# Patient Record
Sex: MALE | Race: WHITE | Employment: FULL TIME | URBAN - METROPOLITAN AREA
[De-identification: names, ages, dates, MRNs, and addresses within clinical notes are randomized per-mention and may not be internally consistent; named-entity substitution may affect disease eponyms.]

---

## 2017-01-23 RX ORDER — FLUTICASONE PROPIONATE 50 MCG
1 SPRAY, SUSPENSION (ML) NASAL DAILY
Qty: 1 BOTTLE | Refills: 5 | Status: SHIPPED | OUTPATIENT
Start: 2017-01-23 | End: 2018-02-13 | Stop reason: SDUPTHER

## 2017-07-31 ENCOUNTER — OFFICE VISIT (OUTPATIENT)
Dept: FAMILY MEDICINE CLINIC | Age: 37
End: 2017-07-31

## 2017-07-31 VITALS
RESPIRATION RATE: 12 BRPM | TEMPERATURE: 98.5 F | BODY MASS INDEX: 27.37 KG/M2 | SYSTOLIC BLOOD PRESSURE: 110 MMHG | WEIGHT: 180 LBS | HEART RATE: 63 BPM | DIASTOLIC BLOOD PRESSURE: 80 MMHG

## 2017-07-31 DIAGNOSIS — R19.7 DIARRHEA, UNSPECIFIED TYPE: Primary | ICD-10-CM

## 2017-07-31 PROCEDURE — 99213 OFFICE O/P EST LOW 20 MIN: CPT | Performed by: NURSE PRACTITIONER

## 2017-07-31 ASSESSMENT — ENCOUNTER SYMPTOMS
DIARRHEA: 1
SHORTNESS OF BREATH: 0
BLOOD IN STOOL: 0
VOMITING: 0
NAUSEA: 0
ABDOMINAL PAIN: 1
CONSTIPATION: 0

## 2017-07-31 ASSESSMENT — PATIENT HEALTH QUESTIONNAIRE - PHQ9
SUM OF ALL RESPONSES TO PHQ9 QUESTIONS 1 & 2: 0
SUM OF ALL RESPONSES TO PHQ QUESTIONS 1-9: 0
1. LITTLE INTEREST OR PLEASURE IN DOING THINGS: 0
2. FEELING DOWN, DEPRESSED OR HOPELESS: 0

## 2017-09-06 ENCOUNTER — TELEPHONE (OUTPATIENT)
Dept: FAMILY MEDICINE CLINIC | Age: 37
End: 2017-09-06

## 2017-09-13 ENCOUNTER — OFFICE VISIT (OUTPATIENT)
Dept: FAMILY MEDICINE CLINIC | Age: 37
End: 2017-09-13

## 2017-09-13 VITALS
SYSTOLIC BLOOD PRESSURE: 130 MMHG | HEIGHT: 68 IN | BODY MASS INDEX: 26.95 KG/M2 | TEMPERATURE: 98.4 F | WEIGHT: 177.8 LBS | RESPIRATION RATE: 20 BRPM | DIASTOLIC BLOOD PRESSURE: 78 MMHG | HEART RATE: 68 BPM

## 2017-09-13 DIAGNOSIS — R14.3 FLATULENCE: Primary | ICD-10-CM

## 2017-09-13 DIAGNOSIS — L82.1 SEBORRHEIC KERATOSES: ICD-10-CM

## 2017-09-13 PROCEDURE — 99213 OFFICE O/P EST LOW 20 MIN: CPT | Performed by: FAMILY MEDICINE

## 2017-10-04 DIAGNOSIS — R14.3 FLATULENCE: Primary | ICD-10-CM

## 2017-10-04 DIAGNOSIS — Z80.0 FAMILY HISTORY OF COLON CANCER: ICD-10-CM

## 2017-12-14 ENCOUNTER — OFFICE VISIT (OUTPATIENT)
Dept: DERMATOLOGY | Age: 37
End: 2017-12-14

## 2017-12-14 DIAGNOSIS — D48.9 NEOPLASM OF UNCERTAIN BEHAVIOR: Primary | ICD-10-CM

## 2017-12-14 DIAGNOSIS — Z78.9 NON-TOBACCO USER: ICD-10-CM

## 2017-12-14 DIAGNOSIS — L82.1 SEBORRHEIC KERATOSES: ICD-10-CM

## 2017-12-14 DIAGNOSIS — L57.8 PHOTOAGING OF SKIN: ICD-10-CM

## 2017-12-14 DIAGNOSIS — D22.9 MULTIPLE BENIGN MELANOCYTIC NEVI: ICD-10-CM

## 2017-12-14 PROCEDURE — 99243 OFF/OP CNSLTJ NEW/EST LOW 30: CPT | Performed by: DERMATOLOGY

## 2017-12-14 PROCEDURE — 11100 PR BIOPSY OF SKIN LESION: CPT | Performed by: DERMATOLOGY

## 2017-12-14 RX ORDER — FEXOFENADINE HCL 180 MG/1
180 TABLET ORAL DAILY
COMMUNITY

## 2017-12-14 NOTE — PROGRESS NOTES
[facility]) Dermatology  [facility] Current, [address], [facility]       Irene Grant  1980    42 y.o. male     Date of Visit: 2017    Chief Complaint:   Chief Complaint   Patient presents with    Skin Exam    Lesion(s)     left cheek, had previous removal but has come back and right chest        I was asked to see this patient by Dr. Erin Leigh. History of Present Illness:  Irene Grant is a 42 y.o. male who presents with the chief complaint of Establish care and for the followin. Inflamed of a spot to his left cheek that had been previously shave removed by outside physician years ago. He was never told that it was cancerous. The lesion has reappeared over last several months to years. Denies symptoms of pain, itching, bleeding, tenderness. She would like evaluated. 2.  TBSE. Many year history of multiple nevi on the trunk and extremities, all present for many years. Denies new moles. Denies moles changing in size, shape, color. None associated w/ pain, bleeding, pruritus. 3.  Complains of a spot to his right upper chest that is pink in color and has appeared over a year time frame. Thinks it possibly may be enlarging, he denies associated burning, bleeding, pain, or itch. Admits to history of sun exposure without wearing sunscreen, hats, protective clothing. He does try to make more of an effort to wear these items when outdoors in recent years. He admits to burning easily. Review of Systems:  Constitutional: Reports general sense of well-being   Skin: No new or changing moles, no history of keloids or hypertrophic scars. Heme: No abnormal bruising or bleeding. Past Medical History, Family History, Surgical History, Medications and Allergies reviewed. Past Skin Hx:   Patient denies past history of melanoma, NMSC, dysplastic nevi, or chronic skin rashes.     PFHx: Mother father-history of basal cell carcinoma    Family History   Problem Relation Age of Onset    Cancer Mother      Shelia Cueva Father      thinks [phone]     Past Medical History:   Diagnosis Date    Allergic rhinitis, seasonal     Keratosis pilaris      History reviewed. No pertinent surgical history. Allergies   Allergen Reactions    Amoxicillin Hives     Outpatient Prescriptions Marked as Taking for the 12/14/17 encounter (Office Visit) with Ayesha Michel DO   Medication Sig Dispense Refill    fexofenadine (ALLEGRA ALLERGY) 180 MG tablet Take 180 mg by mouth daily      fluticasone (FLONASE) 50 MCG/ACT nasal spray 1 spray by Nasal route daily 1 Bottle 5    Multiple Vitamins-Minerals (ONE DAILY MULTIVITAMIN MEN) TABS Take by mouth         Social History:   Social History     Social History    Marital status:      Spouse name: Lelo Gary Number of children: 3    Years of education: N/A     Occupational History           Social History Main Topics    Smoking status: Never Smoker    Smokeless tobacco: Never Used      Comment: counseled on tobacco exposure avoidance    Alcohol use No    Drug use: No    Sexual activity: Yes     Partners: Female     Other Topics Concern    Not on file     Social History Narrative    No narrative on file       Physical Examination     The following were examined and determined to be normal: Psych/Neuro, Scalp/hair, Head/face, Conjunctivae/eyelids, Gums/teeth/lips, Neck, Abdomen, Back, RUE, LUE, RLE, LLE and Nails/digits. Groin/buttocks. Genitalia not examined. The following were examined and determined to be abnormal: Breast/axilla/chest.     -General: NAD, well-nourished, well-developed. Total body skin exam performed, areas examined listed above:   1. 1.2x1.0 erythematous papules with irregular border located to right superior medial chest    2.stuck-on appearing tan-brown verrucous papules and plaques to left cheek, abdomen, back  3.  Scattered on the trunk and extremities are multiple well-defined round and oval symmetric smoothly-bordered uniformly brown macules and papules. no change in size/shape/color of any lesions; no bleeding lesions. 4.  Face, neck, chest, upper extremities-Scattered dyspigmentation with multiple ephelides, and lentigines consistent with chronic sun exposure    Assessment and Plan     1. Neoplasm of uncertain behavior    2. Seborrheic keratoses    3. Multiple benign melanocytic nevi    4. Photoaging of skin    5. Non-tobacco user        1. Neoplasm of uncertain behavior  DDX: Rule out [facility]  -Discussed possible diagnosis. Patient agreeable to biopsy. Verbal consent obtained after risks (infection, bleeding, scar), benefits and alternatives explained. -Area(s) to be biopsied were marked with a surgical pen. Site(s) were cleansed with alcohol. Local anesthesia achieved with 1% lidocaine with epinephrine/sodium bicarbonate. Shave biopsy performed with a razor blade. Hemostasis was achieved with aluminum chloride and electrodessication. The wound(s) were dressed with petrolatum and covered with a bandage. Specimen(s) sent to pathology. Pt educated re: risk of bleeding, infection, scar and wound care instructions.    - SC BIOPSY OF SKIN LESION    2. Seborrheic keratoses  Patient educated that seborrheic keratoses have no malignant potential.    -Reassurance provided to the patient regarding their chronic and benign nature. No treatment performed    3.  Multiple benign melanocytic nevi  Benign acquired melanocytic nevi  -Recommend monthly self skin exams   -Educated regarding the ABCDEs of melanoma detection   -Call for any new/changing moles or concerning lesions  -Reviewed sun protective behavior -- sun avoidance during the peak hours of the day, sun-protective clothing (including hat and sunglasses), sunscreen use (water resistant, broad spectrum, SPF at least 30, need for reapplication every 2 to 3 hours), avoidance of tanning beds   -Plan: Observation with annual skin checks (earlier if indicated) performed

## 2017-12-14 NOTE — PATIENT INSTRUCTIONS
Biopsy Wound Care Instructions   Cleanse the wound with mild soapy water daily.  Gently dry the area.  Apply Vaseline or petroleum jelly to the wound using a cotton tipped applicator or Qtip.  Cover with a clean bandage.  Repeat this process until the biopsy site is healed.  If you had stitches placed, continue treating the site until the stitches are removed. Remember to make an appointment to return to have your stitches removed by our staff.  You may shower and bathe as usual.   ** Biopsy results generally take around 7 business days to come back. If you have not heard from us by then, please call the office at [phone] between 8AM and 4PM Monday through Friday. Seborrheic keratosis    Educational Overview:  Seborrheic keratosis (seb-o-REE-ik care-uh-TOE-sis) is a common benign, or harmless, skin growth that affect people over the age of 27. They are not cancer and do not increase the risk of developing skin cancer. The exact cause is unknown but the tendency to develop SKs seems to be inherited. Almost all adults develop one or more seborrheic keratoses (SKs) and some people may develop many. Some growths may have a warty surface while others look like dabs of warm, brown candle wax on the skin. Seborrheic keratoses range in color from white to black; however, most are tan or brown. You can find these harmless growths anywhere on the skin, except the palms and soles. Most often, youll see them on the chest, back, head, or neck. The condition is more likely with advancing age, and the number of growths often increases over the years. Seborrheic keratoses are not contagious.     Because of the benign nature of seborrheic keratoses, they can be left untreated if they are non-problematic  In cases where SKs are consistently irritated with shaving, itch or bleed excessively, enlarge, become irritated by clothing or other sources of contact, or are cosmetically undesirable, please contact

## 2018-01-11 ENCOUNTER — TELEPHONE (OUTPATIENT)
Dept: DERMATOLOGY | Age: 38
End: 2018-01-11

## 2018-01-11 NOTE — TELEPHONE ENCOUNTER
Pt wife Boni Other [phone]. Ante  Pt wife states:   - calling to see if results are available  Please call to discuss thanks

## 2018-02-16 ENCOUNTER — PROCEDURE VISIT (OUTPATIENT)
Dept: DERMATOLOGY | Age: 38
End: 2018-02-16

## 2018-02-16 VITALS — WEIGHT: 193.6 LBS | SYSTOLIC BLOOD PRESSURE: 138 MMHG | DIASTOLIC BLOOD PRESSURE: 84 MMHG | BODY MASS INDEX: 29.44 KG/M2

## 2018-02-16 DIAGNOSIS — C44.91 NODULAR BASAL CELL CARCINOMA: Primary | ICD-10-CM

## 2018-02-16 PROCEDURE — 17262 DSTRJ MAL LES T/A/L 1.1-2.0: CPT | Performed by: DERMATOLOGY

## 2018-02-16 NOTE — PROGRESS NOTES
Occupational History           Social History Main Topics    Smoking status: Never Smoker    Smokeless tobacco: Never Used      Comment: counseled on tobacco exposure avoidance    Alcohol use No    Drug use: No    Sexual activity: Yes     Partners: Female     Other Topics Concern    Not on file     Social History Narrative    No narrative on file       Physical Examination     Well appearing. 1. Right superior medial chest- 1.2cm pink patch within well healing biopsy scar    Assessment and Plan     1. Nodular basal cell carcinoma        1. Nodular basal cell carcinoma  Curettage performed after informed written consent obtained following explanation of risks, benefits, alternatives  -Local anesthesia acheived with 1% lidocaine with epinephrine/sodium bicarbonate. Sharp curettage performed in 3 different directions with 3-4mm margins. First pass size 1.8 cm. Hemostasis obtained with aluminum chloride and electrodessication.    -Edu re: bleeding, discomfort, infection, scar  -Edu re: wound care, risk of recurrence    - HI DESTR MALIG TRUNK,EXTREM 1.1-2 CM        Return if symptoms worsen or fail to improve, for as scheduled for 6 month TBSE.

## 2018-06-22 ENCOUNTER — OFFICE VISIT (OUTPATIENT)
Dept: DERMATOLOGY | Age: 38
End: 2018-06-22

## 2018-06-22 DIAGNOSIS — L82.0 INFLAMED SEBORRHEIC KERATOSIS: ICD-10-CM

## 2018-06-22 DIAGNOSIS — Z85.828 HISTORY OF BASAL CELL CARCINOMA: ICD-10-CM

## 2018-06-22 DIAGNOSIS — L57.0 ACTINIC KERATOSIS: Primary | ICD-10-CM

## 2018-06-22 DIAGNOSIS — L90.5 CICATRIX: ICD-10-CM

## 2018-06-22 DIAGNOSIS — L82.1 SEBORRHEIC KERATOSIS: ICD-10-CM

## 2018-06-22 DIAGNOSIS — D22.9 MULTIPLE BENIGN MELANOCYTIC NEVI: ICD-10-CM

## 2018-06-22 PROCEDURE — 17000 DESTRUCT PREMALG LESION: CPT | Performed by: DERMATOLOGY

## 2018-06-22 PROCEDURE — 17110 DESTRUCTION B9 LES UP TO 14: CPT | Performed by: DERMATOLOGY

## 2018-06-22 PROCEDURE — 99213 OFFICE O/P EST LOW 20 MIN: CPT | Performed by: DERMATOLOGY

## 2018-06-22 RX ORDER — TRIAMCINOLONE ACETONIDE 1 MG/G
CREAM TOPICAL
Qty: 60 G | Refills: 0 | Status: SHIPPED | OUTPATIENT
Start: 2018-06-22 | End: 2021-12-13 | Stop reason: ALTCHOICE

## 2018-10-12 ENCOUNTER — TELEPHONE (OUTPATIENT)
Dept: FAMILY MEDICINE CLINIC | Age: 38
End: 2018-10-12

## 2018-10-15 ENCOUNTER — OFFICE VISIT (OUTPATIENT)
Dept: FAMILY MEDICINE CLINIC | Age: 38
End: 2018-10-15
Payer: COMMERCIAL

## 2018-10-15 ENCOUNTER — HOSPITAL ENCOUNTER (OUTPATIENT)
Age: 38
Discharge: HOME | End: 2018-10-15
Payer: COMMERCIAL

## 2018-10-15 ENCOUNTER — HOSPITAL ENCOUNTER (OUTPATIENT)
Dept: GENERAL RADIOLOGY | Age: 38
Discharge: HOME | End: 2018-10-15
Payer: COMMERCIAL

## 2018-10-15 VITALS
WEIGHT: 200 LBS | BODY MASS INDEX: 30.31 KG/M2 | DIASTOLIC BLOOD PRESSURE: 84 MMHG | RESPIRATION RATE: 16 BRPM | HEART RATE: 100 BPM | SYSTOLIC BLOOD PRESSURE: 138 MMHG | TEMPERATURE: 99.4 F | HEIGHT: 68 IN

## 2018-10-15 DIAGNOSIS — R10.84 GENERALIZED ABDOMINAL PAIN: ICD-10-CM

## 2018-10-15 DIAGNOSIS — R10.84 GENERALIZED ABDOMINAL PAIN: Primary | ICD-10-CM

## 2018-10-15 PROCEDURE — 74019 RADEX ABDOMEN 2 VIEWS: CPT

## 2018-10-15 PROCEDURE — 99214 OFFICE O/P EST MOD 30 MIN: CPT | Performed by: FAMILY MEDICINE

## 2018-10-15 RX ORDER — DICYCLOMINE HCL 20 MG
20 TABLET ORAL 3 TIMES DAILY PRN
Qty: 90 TABLET | Refills: 3 | Status: SHIPPED | OUTPATIENT
Start: 2018-10-15 | End: 2019-02-19 | Stop reason: SDUPTHER

## 2018-10-15 ASSESSMENT — PATIENT HEALTH QUESTIONNAIRE - PHQ9
2. FEELING DOWN, DEPRESSED OR HOPELESS: 0
1. LITTLE INTEREST OR PLEASURE IN DOING THINGS: 0
SUM OF ALL RESPONSES TO PHQ9 QUESTIONS 1 & 2: 0
SUM OF ALL RESPONSES TO PHQ QUESTIONS 1-9: 0
SUM OF ALL RESPONSES TO PHQ QUESTIONS 1-9: 0

## 2018-10-15 NOTE — PROGRESS NOTES
 Skin lesion    Keratosis pilaris    Allergic rhinitis, seasonal    Flatulence      Past Medical History:   Diagnosis Date    Allergic rhinitis, seasonal     Basal cell carcinoma     chest    Keratosis pilaris       Cholesterol, Total   Date Value Ref Range Status   02/03/2015 205 (H) 0 - 199 mg/dL Final     LDL Calculated   Date Value Ref Range Status   02/03/2015 139 (H) <100 mg/dL Final     HDL   Date Value Ref Range Status   02/03/2015 43 40 - 60 mg/dL Final     Triglycerides   Date Value Ref Range Status   02/03/2015 114 0 - 150 mg/dL Final     Lab Results   Component Value Date    GLUCOSE 87 02/03/2015     Lab Results   Component Value Date     02/03/2015    K 4.5 02/03/2015    CREATININE 0.9 02/03/2015     No results found for: WBC, HGB, HCT, MCV, PLT  Lab Results   Component Value Date    ALT 20 02/03/2015    AST 21 02/03/2015    ALKPHOS 76 02/03/2015    BILITOT 0.3 02/03/2015     No results found for: TSH  Lab Results   Component Value Date    LABA1C 5.8 02/03/2015     PROBLEM VISIT NOTE   Subjective:     Chief Complaint   Patient presents with   Jyothi Woodardy is a 34 y.o. male who presents gas, low bilat abd pressure, gurgling  Hard to pass gas  Pencil thin stools x 1 year, neg colon in past year- Berton Huger internal hemorrhoid  Duration over a year  Associated with discomfort,. painful  Denies nausea,vomiting,   Worse with sitting   Stools soft but nit watery  Better with having a BM or laying on side   Tried laying on side, avoiding diary, gasX, lactacid    No trigger- no foods, no stress  No diet changes  Denies foreign travel. Patient's medications, allergies, past medical, and social histories were reviewed and updated as appropriate (see below).     CHART REVIEW  Health Maintenance   Topic Date Due    HIV screen  03/15/1993    A1C test (Diabetic or Prediabetic)  02/03/2016    Lipid screen  02/03/2020    DTaP/Tdap/Td vaccine (2 - Tdap) 06/02/2021    Flu vaccine  Completed Patient Active Problem List   Diagnosis    Skin lesion    Keratosis pilaris    Allergic rhinitis, seasonal    Flatulence     Cholesterol, Total   Date Value Ref Range Status   02/03/2015 205 (H) 0 - 199 mg/dL Final     LDL Calculated   Date Value Ref Range Status   02/03/2015 139 (H) <100 mg/dL Final     HDL   Date Value Ref Range Status   02/03/2015 43 40 - 60 mg/dL Final     Triglycerides   Date Value Ref Range Status   02/03/2015 114 0 - 150 mg/dL Final     Lab Results   Component Value Date    GLUCOSE 87 02/03/2015     Lab Results   Component Value Date     02/03/2015    K 4.5 02/03/2015    CREATININE 0.9 02/03/2015     No results found for: WBC, HGB, HCT, MCV, PLT  Lab Results   Component Value Date    ALT 20 02/03/2015    AST 21 02/03/2015    ALKPHOS 76 02/03/2015    BILITOT 0.3 02/03/2015     No results found for: TSH  Lab Results   Component Value Date    LABA1C 5.8 02/03/2015     Current Outpatient Prescriptions   Medication Sig Dispense Refill    dicyclomine (BENTYL) 20 MG tablet Take 1 tablet by mouth 3 times daily as needed (gas) 90 tablet 3    triamcinolone (KENALOG) 0.1 % cream Apply to scar BID for up to 2 weeks to reduce itch. Then only sparingly PRN if itch returns 60 g 0    fluticasone (FLONASE) 50 MCG/ACT nasal spray PLACE ONE SPRAY IN EACH NOSTRIL ONCE DAILY 16 g 5    fexofenadine (ALLEGRA ALLERGY) 180 MG tablet Take 180 mg by mouth daily      Multiple Vitamins-Minerals (ONE DAILY MULTIVITAMIN MEN) TABS Take by mouth       No current facility-administered medications for this visit. Review of Systems   General ROS: fever? No,    Night sweats? No  Endocrine ROS:malaise/lethargy? No   Unexpected weight changes? No  Respiratory ROS: cough? No   Shortness of breath? No  Cardiovascular ROS:chest pain? No   Shortness of breath with exertion? No  Gastrointestinal ROS: abdominal pain? Yes   Change in stools? Yes, skinny BM's   Genito-Urinary ROS: painful urination?  No   Trouble

## 2018-10-15 NOTE — PATIENT INSTRUCTIONS
intestine. Breakdown of Undigested Foods  The body does not digest and absorb some carbohydrates--the sugar, starches, and fiber found in many foods--in the small intestine because of a shortage or absence of certain enzymes that aid digestion. This undigested food then passes from the small intestine into the large intestine, where normal, harmless bacteria break down the food, producing hydrogen, carbon dioxide, and, in about one-third of all people, methane. Eventually these gases exit through the rectum. People who make methane do not necessarily pass more gas or have unique symptoms. A person who produces methane will have stools that consistently float in water. Research has not shown why some people produce methane and others do not. Foods that produce gas in one person may not cause gas in another. Some common bacteria in the large intestine can destroy the hydrogen that other bacteria produce. The balance of the two types of bacteria may explain why some people have more gas than others. Which foods cause gas? Most foods that contain carbohydrates can cause gas. By contrast, fats and proteins cause little gas. Sugars  The sugars that cause gas are raffinose, lactose, fructose, and sorbitol. Raffinose. Beans contain large amounts of this complex sugar. Smaller amounts are found in cabbage, brussels sprouts, broccoli, asparagus, other vegetables, and whole grains. Lactose. Lactose is the natural sugar in milk. It is also found in milk products, such as cheese and ice cream, and processed foods, such as bread, cereal, and salad dressing. Many people, particularly those of , , or  background, normally have low levels of lactase, the enzyme needed to digest lactose, after childhood. Also, as people age, their enzyme levels decrease. As a result, over time people may experience increasing amounts of gas after eating food containing lactose. Fructose.  Fructose is naturally present in onions, artichokes, pears, and wheat. It is also used as a sweetener in some soft drinks and fruit drinks. Sorbitol. Sorbitol is a sugar found naturally in fruits, including apples, pears, peaches, and prunes. It is also used as an artificial sweetener in many dietetic foods and sugar-free candies and gums. Starches  Most starches, including potatoes, corn, pasta, and wheat, produce gas as they are broken down in the large intestine. Rice is the only starch that does not cause gas. Fiber  Many foods contain soluble and insoluble fiber. Soluble fiber dissolves easily in water and takes on a soft, gel-like texture in the intestines. Found in oat bran, beans, peas, and most fruits, soluble fiber is not broken down until it reaches the large intestine, where digestion causes gas. Insoluble fiber, on the other hand, passes essentially unchanged through the intestines and produces little gas. Wheat bran and some vegetables contain this kind of fiber. What are some symptoms and problems of gas? The most common symptoms of gas are flatulence, abdominal bloating, abdominal pain, and belching. However, not everyone experiences these symptoms. The type and degree of symptoms probably depends on how much gas the body produces, how many fatty acids the body absorbs, and a person's sensitivity to gas in the large intestine. Belching  An occasional belch during or after meals is normal and releases gas when the stomach is full of food. However, people who belch frequently may be swallowing too much air and releasing it before the air enters the stomach. Sometimes a person with chronic belching may have an upper gastrointestinal (GI) disorder, such as peptic ulcer disease, gastroesophageal reflux disease (GERD), or gastroparesis, also called delayed gastric emptying.   Sometimes people believe that swallowing air and releasing it will relieve the discomfort of these disorders, and they may intentionally or little gas. Foods that may cause gas include   beans   vegetables, such as broccoli, cabbage, brussels sprouts, onions, artichokes, and asparagus   fruits, such as pears, apples, and peaches   whole grains, such as whole wheat and bran   soft drinks and fruit drinks   milk and milk products, such as cheese and ice cream, and packaged foods prepared with lactose, such as bread, cereal, and salad dressing   foods containing sorbitol, such as dietetic foods and sugar-free candies and gums   The most common symptoms of gas are belching, flatulence, bloating, and abdominal pain. However, some of these symptoms may be caused by an intestinal disorder, such as IBS, rather than too much gas. The most common ways to reduce the discomfort of gas are changing one's diet, taking digestive enzymes to help digest carbohydrates, and reducing the amount of air swallowed. IRRITABLE BOWEL SYNDROME    Overview   What is irritable bowel syndrome? Irritable bowel syndrome (IBS) is a chronic (ongoing) problem with the large intestine. In people who have IBS, food moves too quickly or too slowly through the intestines. This can cause pain or discomfort (see symptoms below) and emotional distress, but it does not damage the large intestine. IBS is very common and occurs more often in women. IBS is also called functional bowel syndrome, irritable colon, spastic bowel and spastic colon. It's not the same as inflammatory bowel diseases like ulcerative colitis. Symptoms   What are the symptoms of IBS?   Common IBS symptoms  Bloating and gas   Mucus in the stool   Constipation   Diarrhea, especially after eating or first thing in the morning   Alternating between constipation and diarrhea   Feeling like you still need to have a bowel movement after you've already had one   Feeling a strong urge to have a bowel movement   Abdominal pain and cramping that may go away after having a bowel movement   The symptoms may get worse

## 2018-12-14 ENCOUNTER — OFFICE VISIT (OUTPATIENT)
Dept: DERMATOLOGY | Age: 38
End: 2018-12-14
Payer: COMMERCIAL

## 2018-12-14 DIAGNOSIS — Z85.828 HISTORY OF BASAL CELL CARCINOMA: ICD-10-CM

## 2018-12-14 DIAGNOSIS — L57.0 ACTINIC KERATOSIS: Primary | ICD-10-CM

## 2018-12-14 DIAGNOSIS — D22.9 MULTIPLE BENIGN MELANOCYTIC NEVI: ICD-10-CM

## 2018-12-14 PROCEDURE — 17000 DESTRUCT PREMALG LESION: CPT | Performed by: DERMATOLOGY

## 2018-12-14 PROCEDURE — 99213 OFFICE O/P EST LOW 20 MIN: CPT | Performed by: DERMATOLOGY

## 2018-12-14 NOTE — PROGRESS NOTES
DO   Medication Sig Dispense Refill    dicyclomine (BENTYL) 20 MG tablet Take 1 tablet by mouth 3 times daily as needed (gas) 90 tablet 3    triamcinolone (KENALOG) 0.1 % cream Apply to scar BID for up to 2 weeks to reduce itch. Then only sparingly PRN if itch returns 60 g 0    fluticasone (FLONASE) 50 MCG/ACT nasal spray PLACE ONE SPRAY IN EACH NOSTRIL ONCE DAILY 16 g 5    fexofenadine (ALLEGRA ALLERGY) 180 MG tablet Take 180 mg by mouth daily      Multiple Vitamins-Minerals (ONE DAILY MULTIVITAMIN MEN) TABS Take by mouth         Social History:   Social History     Social History    Marital status:      Spouse name: Martin Marin Number of children: 3    Years of education: N/A     Occupational History           Social History Main Topics    Smoking status: Never Smoker    Smokeless tobacco: Never Used      Comment: counseled on tobacco exposure avoidance    Alcohol use No    Drug use: No    Sexual activity: Yes     Partners: Female     Other Topics Concern    Not on file     Social History Narrative    No narrative on file       Physical Examination     The following were examined and determined to be normal: Psych/Neuro, Scalp/hair, Conjunctivae/eyelids, Gums/teeth/lips, Neck, Breast/axilla/chest, Abdomen, Back, RUE, LUE, RLE, LLE and Nails/digits. Groin/buttocks. Genitalia not examined. The following were examined and determined to be abnormal: Head/face. -General: NAD, well-nourished, well-developed. Total body skin exam performed, areas examined listed above:   1.  ill defined irreg shaped gritty keratotic pink macule(s) located to left temple  2. Scattered on the trunk and extremities are multiple well-defined round and oval symmetric smoothly-bordered uniformly brown macules and papules. no change in size/shape/color of any lesions; no bleeding lesions.   3.  Right superior medial chest-well-healed scar at site of prior BCC, no evidence of recurrence    Assessment and Plan     1. Actinic keratosis    2. Multiple benign melanocytic nevi    3. History of basal cell carcinoma        1. Actinic keratosis  -Edu re: relationship with chronic cumulative sun exposure, low premalignant potential.   -Left temple, 1 lesion(s) treated w/ liquid nitrogen x 1cycles, 3 seconds each located    Edu re: risk of blister formation, discomfort, scar, hypopigmentation. Discussed wound care. -Reviewed sun protective behavior -- sun avoidance during the peak hours of the day, sun-protective clothing (including hat and sunglasses), sunscreen use (water resistant, broad spectrum, SPF at least 30, need for reapplication every 2 to 3 hours). -Patient to contact office if AK fails to resolve despite treatment, or if patient develops side effect from therapy, such as unbearable crusting, scabbing, redness, or tenderness.      - MA DESTRUC PREMALIGNANT, FIRST LESION    2. Multiple benign melanocytic nevi  Benign acquired melanocytic nevi  -Recommend monthly self skin exams   -Educated regarding the ABCDEs of melanoma detection   -Call for any new/changing moles or concerning lesions  -Reviewed sun protective behavior -- sun avoidance during the peak hours of the day, sun-protective clothing (including hat and sunglasses), sunscreen use (water resistant, broad spectrum, SPF at least 30, need for reapplication every 2 to 3 hours), avoidance of tanning beds   -Plan: Observation with annual skin checks (earlier if indicated) performed in office to monitor current nevi and to assess for new lesions. 3. History of basal cell carcinoma  No evidence of recurrence  RTC 1 year TBSE        Note is transcribed using voice recognition software. Inadvertent computerized transcription errors may be present. Return in about 1 year (around 12/14/2019) for TBSE.

## 2019-02-19 DIAGNOSIS — R10.84 GENERALIZED ABDOMINAL PAIN: ICD-10-CM

## 2019-02-19 RX ORDER — DICYCLOMINE HCL 20 MG
TABLET ORAL
Qty: 90 TABLET | Refills: 1 | Status: SHIPPED | OUTPATIENT
Start: 2019-02-19 | End: 2021-12-14 | Stop reason: ALTCHOICE

## 2019-07-05 ENCOUNTER — TELEPHONE (OUTPATIENT)
Dept: DERMATOLOGY | Age: 39
End: 2019-07-05

## 2019-07-05 NOTE — TELEPHONE ENCOUNTER
Called pt to reschedule a double book for 12/13/19. Offered 12/6/19 at 11am. Requested pt return my call to accept appointment.

## 2019-11-20 ENCOUNTER — OFFICE VISIT (OUTPATIENT)
Dept: FAMILY MEDICINE CLINIC | Age: 39
End: 2019-11-20
Payer: COMMERCIAL

## 2019-11-20 VITALS
HEIGHT: 68 IN | BODY MASS INDEX: 29.55 KG/M2 | SYSTOLIC BLOOD PRESSURE: 128 MMHG | TEMPERATURE: 97.8 F | RESPIRATION RATE: 14 BRPM | OXYGEN SATURATION: 96 % | WEIGHT: 195 LBS | HEART RATE: 73 BPM | DIASTOLIC BLOOD PRESSURE: 84 MMHG

## 2019-11-20 DIAGNOSIS — J20.9 ACUTE BRONCHITIS, UNSPECIFIED ORGANISM: Primary | ICD-10-CM

## 2019-11-20 PROCEDURE — 99213 OFFICE O/P EST LOW 20 MIN: CPT | Performed by: FAMILY MEDICINE

## 2019-11-20 RX ORDER — AZITHROMYCIN 250 MG/1
TABLET, FILM COATED ORAL
Qty: 6 TABLET | Refills: 0 | Status: SHIPPED | OUTPATIENT
Start: 2019-11-20 | End: 2020-09-25

## 2019-11-20 ASSESSMENT — PATIENT HEALTH QUESTIONNAIRE - PHQ9
1. LITTLE INTEREST OR PLEASURE IN DOING THINGS: 0
SUM OF ALL RESPONSES TO PHQ QUESTIONS 1-9: 0
2. FEELING DOWN, DEPRESSED OR HOPELESS: 0
SUM OF ALL RESPONSES TO PHQ QUESTIONS 1-9: 0
SUM OF ALL RESPONSES TO PHQ9 QUESTIONS 1 & 2: 0

## 2019-12-20 ENCOUNTER — OFFICE VISIT (OUTPATIENT)
Dept: DERMATOLOGY | Age: 39
End: 2019-12-20
Payer: COMMERCIAL

## 2019-12-20 DIAGNOSIS — Z87.2 HISTORY OF ACTINIC KERATOSIS: ICD-10-CM

## 2019-12-20 DIAGNOSIS — Z85.828 HISTORY OF BASAL CELL CARCINOMA: ICD-10-CM

## 2019-12-20 DIAGNOSIS — L82.1 SEBORRHEIC KERATOSES: ICD-10-CM

## 2019-12-20 DIAGNOSIS — D22.9 MULTIPLE BENIGN MELANOCYTIC NEVI: Primary | ICD-10-CM

## 2019-12-20 PROCEDURE — 99213 OFFICE O/P EST LOW 20 MIN: CPT | Performed by: DERMATOLOGY

## 2020-09-25 ENCOUNTER — OFFICE VISIT (OUTPATIENT)
Dept: FAMILY MEDICINE CLINIC | Age: 40
End: 2020-09-25
Payer: COMMERCIAL

## 2020-09-25 VITALS
SYSTOLIC BLOOD PRESSURE: 138 MMHG | TEMPERATURE: 97 F | WEIGHT: 196 LBS | BODY MASS INDEX: 29.8 KG/M2 | HEART RATE: 73 BPM | OXYGEN SATURATION: 99 % | DIASTOLIC BLOOD PRESSURE: 80 MMHG

## 2020-09-25 PROCEDURE — 99213 OFFICE O/P EST LOW 20 MIN: CPT | Performed by: FAMILY MEDICINE

## 2020-09-25 RX ORDER — CLINDAMYCIN HYDROCHLORIDE 300 MG/1
300 CAPSULE ORAL 3 TIMES DAILY
Qty: 21 CAPSULE | Refills: 0 | Status: SHIPPED | OUTPATIENT
Start: 2020-09-25 | End: 2020-10-02

## 2020-09-25 ASSESSMENT — PATIENT HEALTH QUESTIONNAIRE - PHQ9
1. LITTLE INTEREST OR PLEASURE IN DOING THINGS: 0
2. FEELING DOWN, DEPRESSED OR HOPELESS: 0
SUM OF ALL RESPONSES TO PHQ QUESTIONS 1-9: 0
SUM OF ALL RESPONSES TO PHQ9 QUESTIONS 1 & 2: 0
SUM OF ALL RESPONSES TO PHQ QUESTIONS 1-9: 0

## 2020-09-25 NOTE — PROGRESS NOTES
9/25/2020    This is a 41 y.o. male   Chief Complaint   Patient presents with    Other     [address] x YEARS     HPI  Here for concern for a lump between his rectum and scrotum. He has noticed this over the past 2 days and it hurts to sit, walk, hard to get comfortable. - Has maybe decreased in size and the pain has improved since first noted over the past 48 hours. Notices a sharper pain when coughing or other valsalva. Does not bother him with a BM. Otherwise it's more of a dull discomfort. - tried some hemorrhoid cream and wipes which weren't helpful  - no fever, chills, or systemic symptoms  - no nausea or vomiting. No stool changes. No black or tarry stools, no blood in stool or with wiping  - no dysuria or voiding symptoms    Review of Systems   As per HPI, otherwise negative    Past Medical History:   Diagnosis Date    Allergic rhinitis, seasonal     Basal cell carcinoma     chest    Keratosis pilaris      No past surgical history on file. Family History   Problem Relation Age of Onset    Cancer Mother         Thinks BCC    Cancer Father         thinks BCC     Current Outpatient Medications   Medication Sig Dispense Refill    clindamycin (CLEOCIN) 300 MG capsule Take 1 capsule by mouth 3 times daily for 7 days 21 capsule 0    fluticasone (FLONASE) 50 MCG/ACT nasal spray PLACE ONE SPRAY IN EACH NOSTRIL ONCE DAILY 16 g 5    fexofenadine (ALLEGRA ALLERGY) 180 MG tablet Take 180 mg by mouth daily      Multiple Vitamins-Minerals (ONE DAILY MULTIVITAMIN MEN) TABS Take by mouth      dicyclomine (BENTYL) 20 MG tablet TAKE ONE TABLET BY MOUTH THREE TIMES A DAY AS NEEDED FOR GAS 90 tablet 1    triamcinolone (KENALOG) 0.1 % cream Apply to scar BID for up to 2 weeks to reduce itch. Then only sparingly PRN if itch returns (Patient not taking: Reported on 12/20/2019) 60 g 0     No current facility-administered medications for this visit.       /80   Pulse 73   Temp 97 °F (36.1 °C)   Wt 196 lb (88.9 kg)   SpO2 99%   BMI 29.80 kg/m²     Physical Exam  Skin:     Comments: Firm, Right sided perirectal mass approx 3x2in  No external skin changes         Wt Readings from Last 3 Encounters:   09/25/20 196 lb (88.9 kg)   11/20/19 195 lb (88.5 kg)   10/15/18 200 lb (90.7 kg)     BP Readings from Last 3 Encounters:   09/25/20 138/80   11/20/19 128/84   10/15/18 138/84       Assessment/Plan:  1. Perirectal abscess  - Rian Gould MD, Colorectal Surgery, [facility]  - clindamycin (CLEOCIN) 300 MG capsule; Take 1 capsule by mouth 3 times daily for 7 days  Dispense: 21 capsule; Refill: 0    Perirectal mass is likely an abscess but no superficial skin changes as of yet and remains indurated  Start antibiotics. Warm bath soaks.  Advised seeing colorectal next week to see if it has evolved and is amenable to drainage  Call for concerning symptoms that we reviewed

## 2020-09-29 ENCOUNTER — OFFICE VISIT (OUTPATIENT)
Dept: SURGERY | Age: 40
End: 2020-09-29
Payer: COMMERCIAL

## 2020-09-29 VITALS
SYSTOLIC BLOOD PRESSURE: 135 MMHG | TEMPERATURE: 97.3 F | BODY MASS INDEX: 30.46 KG/M2 | WEIGHT: 201 LBS | HEIGHT: 68 IN | HEART RATE: 73 BPM | DIASTOLIC BLOOD PRESSURE: 94 MMHG | RESPIRATION RATE: 16 BRPM

## 2020-09-29 PROCEDURE — 46600 DIAGNOSTIC ANOSCOPY SPX: CPT | Performed by: SURGERY

## 2020-09-29 PROCEDURE — 99204 OFFICE O/P NEW MOD 45 MIN: CPT | Performed by: SURGERY

## 2020-09-29 NOTE — Clinical Note
Joe Collins like his perianal abscess has resolved, but he does have an anal fissure, mild hemorrhoids, and some sort of pelvic outlet dysfunction. We will start with conservative medical management and I will reassess in 6 to 8 weeks. Thanks!  Aiyana Brewster

## 2020-09-29 NOTE — PROGRESS NOTES
[address] [address] [address]  Dept: [phone]  Dept Fax: [phone]  Loc: [phone]    Visit Date: 9/29/2020    Papa George is a 41 y.o. male who presents today for: Rectal Pain and Perirectal Abscess      HPI:       Papa George is a 41 y.o. male referred by Dr. Waqas Velez for anorectal discomfort. Patient has had a few weeks of anorectal pain and discomfort. Symptoms occur after BMs. Bleeding: no  Constipation: no  Patient has tried: none  Previous similar symptoms: no  Previous anorectal surgeries: no    Denies fever, chills, abd pain, nausea, emesis, weight loss. Also complains of difficulty with stool evacuation, pencil thin stools. Denies bleeding. States that he occasionally needs to lay on his side in order to properly relieve gas. Patient's problem list, medications, past medical, surgical, family, and social histories were reviewed and updated in the chart as indicated today. Past Medical History:   Diagnosis Date    Allergic rhinitis, seasonal     Basal cell carcinoma     chest    Keratosis pilaris        No past surgical history on file. Family History: Family history of colorectal cancer/polyps: no    Social History:   Social History     Tobacco Use    Smoking status: Never Smoker    Smokeless tobacco: Never Used    Tobacco comment: counseled on tobacco exposure avoidance   Substance Use Topics    Alcohol use: No     Alcohol/week: 0.0 standard drinks      Tobacco cessation counseling provided as appropriate. REVIEW OF SYSTEMS:    Pertinent positives and negatives are mentioned in the HPI above. Otherwise, all other systems were reviewed and negative.       Objective:     Physical Exam   BP (!) 135/94 (Site: Left Upper Arm, Position: Sitting, Cuff Size: Medium Adult)   Pulse 73   Temp 97.3 °F (36.3 °C) (Temporal)   Resp 16   Ht 5' 8\" (1.727 m)   Wt 201 lb (91.2 kg)   BMI 30.56 kg/m² Constitutional: Appears well-developed and well-nourished. Grooming appropriate. No gross deformities. Body mass index is 30.56 kg/m². Eyes: No scleral icterus. Conjunctiva/lids normal. Vision intact grossly. Pupils equal/symmetric, reactive bilaterally. ENT: External ears/nose without defect, scars, or masses. Hearing grossly intact. No facial deformity. Lips normal, normal dentition. Neck: No masses. Trachea midline. No crepitus. Thyroid not enlarged. Cardiovascular: Normal rate. No peripheral edema. Abdominal aorta normal size to palpation. Pulmonary/Chest: Effort normal. No respiratory distress. No wheezes. No use of accessory muscles. Musculoskeletal: Normal range of motion x all 4 extremities and head/neck, without deformity, pain, or crepitus, with normal strength and tone. Normal gait. Nails without clubbing or cyanosis. Neurological: Alert and oriented to person, place, and time. No gross deficits. Sensation intact. Skin: Skin is dry. No rashes noted. No pallor. No induration of nodules. Psychiatric: Normal mood and affect. Behavior normal. Oriented to person, place, and time. Judgment and insight reasonable. Abdominal/wound: soft, nontender    During my initial anorectal exam I was unable to obtain clear visualization of the anal canal, so I determined an anoscopy would be required. I discussed with the patient and they agreed to proceed with anoscopy. ANOSCOPY:    Chaperone/MA present in room during entire exam. Patient was placed in knee-chest position or left side down position depending on comfort. Exam table manipulated for proper visualization and lighting. Buttocks spread. Inspection reveals: no perianal skin lesions, excoriation, or skin tags. Healed perianal abscess anterior peroneum    Digital exam performed with lubricated finger revealing: no masses, induration, or tenderness. No gross blood. Normal tone.      Lubricated anoscope inserted gently into anus and withdrawn for visualization of distal rectum and anal canal: Anal fissure noted anterior midline. Moderately inflamed hemorrhoidal tissue visible. No bleeding noted. Anoscope removed without difficulty. Last colonoscopy: 4 yrs ago - requested outside records - per his recollection, normal except hemorrhoids      Assessment/Plan:     A/P:  New problem(s): Anal fissure, perianal abscess, internal hemorrhoids, pelvic outlet dysfunction  Established problem(s): none  Additional workup/treatment planned: Medical therapy with prescription calcium channel blocker ointment, fiber supplementation, sitz baths, improving dietary and lifestyle choices  Risk of complications/morbidity: moderate    Patient has signs and symptoms consistent with anal fissure. Exam reveals anterior midline acute anal fissure. We will start with conservative management, including fiber supplementation, water, healthy fruits and vegetables, and medical management with perscription topical calcium channel blocker ointment. Discussed the use of the prescription ointment and that it will need to be obtained from a compounding pharmacy, which my office will arrange. If symptoms do not improve in 6-8 weeks, patient may require more invasive treatment options, such as Botox injection, partial sphincterotomy, or fissurectomy with anocutaneous advancement flap. We briefly discussed operative risks of these various options. Patient understands the need for full anorectal exam in the future after resolution of symptoms (or colonoscopy depending on other risk factors for colon cancer). He was also noted to have what seems to be a healed perianal abscess in the anterior perineum. Discussed 50% recurrence rates for these, though currently looks like it is well-healed. Discussed if recurrence, high likelihood for fistula, which could require surgery down the road.     Also noted to have mild to moderate internal hemorrhoids, which are likely not

## 2020-09-29 NOTE — PATIENT INSTRUCTIONS
will provide spasm relief for 1-2 months. Occasionally this needs to be repeated. This is typically performed as a same day surgery with anesthesia/sedation. · Internal sphincterotomy is a surgery in which a portion of the internal sphincter muscle is cut, to relieve the spasm. This procedure has a high success rate, but a higher risk of complications, including rarely a loss of bowel control (incontinence). This is typically performed as a same day surgery with anesthesia/sedation. · Fissurectomy and dermal advancement flap is a surgery in which healthy skin flaps are brought in from around the anus to cover the fissure and promote healing. This surgery is a bit more complex and sometimes require an overnight stay in the hospital.  · The decision of which treatment is right for you depends on the chronicity and severity of your symptoms, age, gender, previous anorectal and other surgeries, underlying bowel control issues, and any suspicion for cancer or other diseases. · Colonoscopy may be recommended at some point in your care if you have not had one recently or bleeding continues after the fissure heals    · Please talk to your colorectal surgeon about any health conditions or concerns you may have regarding your anal fissure treatment. Hemorrhoids: Information and Care Instructions        Hemorrhoids are enlarged veins that develop in the anal canal. They can occur with constipation, diarrhea, straining and pushing during bowel movements, pregnancy, and smoking/coughing. The tissue can get irritated and inflamed, causing bleeding, itching, swelling, and pain. Hemorrhoids can be internal or external (or occasionally both). Sometimes internal hemorrhoids can prolapse, or come out of the anus, causing difficulty keeping clean, mucus drainage, bleeding, and discomfort. External hemorrhoids are more likely to clot and cause sudden severe pain on the outside of the anus.     They can be uncomfortable at and no down time. · Surgical excision (Hemorrhoidectomy): This surgery is performed in the operating room with sedation. The hemorrhoid tissue is cut out and wounds are stitched back together. It has low complications rates and has a 95% long term success rate. It is a painful procedure, however, and most patients require at least 2 weeks off from work/school. This may be recommended for patients with large hemorrhoids, and those who wish to have internal and external hemorrhoids addressed simultaneously, and those who desire the most definitive procedure. · Colonoscopy: This is an adjunct procedure in patients with rectal bleeding. Depending on your age and family history, colonoscopy may be recommended before pursuing hemorrhoid procedures. This is to ensure that the source of bleeding is truly hemorrhoids, and not from polyps, cancer, or inflammation located elsewhere in the colon or rectum. What about over-the-counter ointments, creams, and suppositories? Unfortunately for consumers, there is very little actual scientific data to support the use of any over-the-counter products. These usually aren't harmful to try for a few weeks, and may help with some symptoms, but all in all are a waste of money. Again, these will just merely mask the symptoms and do not actually address the underlying problem. Some of these (especially steroid-based creams), can actually cause damage to the anus and skin if used over a longer period of time (more than 3 weeks). What about external hemorrhoids? External hemorrhoids can clot or \"thrombose\". This can cause sudden onset of severe pain. Typically the clot will dissolve or push through the skin on its own within 5-7 days. However, if patients are seen within the first 1-3 days of the start of the pain, the clot and external hemorrhoid can be excised (cut) in the office, reducing the duration of pain and reducing healing time.  This is typically done with local best evaluated at the time of surgery. Occasionally, an MRI is ordered in the case of recurrent or complex fistula disease. Colonoscopy may be recommended if there is any suspicion for Crohn's Disease, such as chronic diarrhea, rectal bleeding, abdominal pain, unintended weight loss, or if you have a family history of Crohn's Disease or Ulcerative Colitis. If the fistula contains only internal sphincter muscle or no muscle at all, often a procedure called a fistulotomy is performed. In this procedure, a probe is used to define the exact path, and the tissue and skin on top of the probe is opened up, and the fistula is allowed to heal from the inside out. This may take 2-4 weeks to completely heal, but results in excellent (95%) long term success. If the fistula contains various amount of the external sphincter muscle, other options may be chosen, depending on factors such as your age, gender, previous surgeries, previous sphincter tears or repairs, and ability to heal:  · Seton Placement: In this procedure, a rubber band-like object (\"seton\") is passed through the fistula tract and loosely tied to itself. This is used to prevent further infection and promote drainage. These are typically used as a bridge (for 2-4 months) to a more complex procedure, but can be left in indefinitely if needed. Setons are inert and do not interfere with bowel movements or daily activities. · LIFT (Ligation of Intersphincteric Fistula Tract): In this procedure, an incision is made between the internal and external sphincter muscles without the need to cut either one. The fistula is found, sutured closed, and cut. The skin side of the fistula is left open to heal from the inside out and to allow for drainage during the healing process. Though this procedure does require an incision into normal tissue, it minimizes the risk of damage to the external sphincter muscle, which makes the risk of incontinence very low.  Long term drive you. You will be receiving sedation and it is illegal to drive yourself home. · Please arrive 2 hours before your scheduled surgery time. · You will go through registration, receive an IV, and meet the anesthesia provider  · You will be offered various anesthetic options, including IV sedation (\"twilight\"), local anesthetic injection, and a spinal injection (this will make you numb from the hips and downward - similar to an epidural used for childbirth). General anesthesia is offered for more complex procedures. · Your surgeon will see you 10-15 minutes before your procedure to insure all of your questions and concerns have been addressed  · During the procedure, a flexible sigmoidoscopy will be performed - this is similar to a colonoscopy, but much shorter, to examine the inside of the rectum  · Next, retractors will be used to examine the fistula and determine which of the above options is best for you. · You will spend anywhere from 1 to 3 hours in the recovery area to insure sedation has worn off and you are safe to go home. After the procedure:  · You may have some drainage or a small amount of bleeding depending on the specifics of the procedure. · Depending on which procedure was done, you may have pain and discomfort. Please see the POST-OP Instructions on recommendations to control pain. · Keep your stools soft with plenty of fiber, water, and healthy fruits and vegetables. MiraLax and colace can be used as needed. · Warm water soaks (5-10 minutes) or gentle cleansing with a showerhead should be done twice daily and after bowel movements to keep the area clean. · Pathology/biopsy results are typically discussed at your postoperative visit.     Risks and potential complications  · Fistula recurrence    · Potential need for future surgery  · Pain  · Rectal bleeding  · Difficulty with urinating (uncommon)  · Temporary changes in your ability to control stool or gas (uncommon)  · Permanent changes in your ability to control stool or gas (rare)  · Infections requiring emergency surgery and colostomy (very rare)    When should you call the office? · You have any questions or concerns. · You don't understand how to prepare for your procedure. · You have excessive bleeding, fever, chills, or inability to urinate  · You need to reschedule or have changed your mind about having the procedure. · Dr Edson Aleman office phone # is [phone]  · If you are unable to reach the office (outside of normal business hours) and you have any concerns, go to your nearest emergency room.

## 2020-11-18 ENCOUNTER — OFFICE VISIT (OUTPATIENT)
Dept: SURGERY | Age: 40
End: 2020-11-18
Payer: COMMERCIAL

## 2020-11-18 VITALS
OXYGEN SATURATION: 100 % | WEIGHT: 208 LBS | HEIGHT: 68 IN | SYSTOLIC BLOOD PRESSURE: 135 MMHG | HEART RATE: 77 BPM | BODY MASS INDEX: 31.52 KG/M2 | DIASTOLIC BLOOD PRESSURE: 87 MMHG | TEMPERATURE: 97.1 F

## 2020-11-18 PROCEDURE — 99212 OFFICE O/P EST SF 10 MIN: CPT | Performed by: SURGERY

## 2020-11-18 NOTE — PROGRESS NOTES
[address] [address] [address]  Dept: [phone]  Dept Fax: [phone]  Loc: [phone]    Visit Date: 11/18/2020    Vu Christiansen is a 41 y.o. male who presents today for: Other (Anal fissure )      HPI:       Vu Christiansen is a 41 y.o. male known to me after previous visit for multiple anorectal issues, including fissure, internal hemorrhoids, perianal abscess. Has been using calcium channel blocker as instructed and has been increasing fiber and other dietary changes. Overall seems to be doing much better. No more bleeding. No more pain. Past Medical History:   Diagnosis Date    Allergic rhinitis, seasonal     Basal cell carcinoma     chest    Keratosis pilaris      No past surgical history on file. Current Outpatient Medications:     triamcinolone (KENALOG) 0.1 % cream, Apply to scar BID for up to 2 weeks to reduce itch. Then only sparingly PRN if itch returns, Disp: 60 g, Rfl: 0    fluticasone (FLONASE) 50 MCG/ACT nasal spray, PLACE ONE SPRAY IN EACH NOSTRIL ONCE DAILY, Disp: 16 g, Rfl: 5    fexofenadine (ALLEGRA ALLERGY) 180 MG tablet, Take 180 mg by mouth daily, Disp: , Rfl:     Multiple Vitamins-Minerals (ONE DAILY MULTIVITAMIN MEN) TABS, Take by mouth, Disp: , Rfl:     dicyclomine (BENTYL) 20 MG tablet, TAKE ONE TABLET BY MOUTH THREE TIMES A DAY AS NEEDED FOR GAS, Disp: 90 tablet, Rfl: 1  Allergies   Allergen Reactions    Amoxicillin Hives     No past surgical history on file.   Family History   Problem Relation Age of Onset    Cancer Mother         Thinks BCC    Cancer Father         thinks [phone]       Social History:   Social History     Tobacco Use    Smoking status: Never Smoker    Smokeless tobacco: Never Used    Tobacco comment: counseled on tobacco exposure avoidance   Substance Use Topics    Alcohol use: No     Alcohol/week: 0.0 standard drinks      Tobacco cessation counseling provided as appropriate. REVIEW OF SYSTEMS:    Pertinent positives and negatives are mentioned in the HPI. Otherwise, all other systems were reviewed and negative. Objective:     Physical Exam   /87   Pulse 77   Temp 97.1 °F (36.2 °C) (Infrared)   Ht 5' 8\" (1.727 m)   Wt 208 lb (94.3 kg)   SpO2 100%   BMI 31.63 kg/m²   Constitutional: Appears well-developed and well-nourished. Grooming appropriate. No gross deformities. Body mass index is 31.63 kg/m². Eyes: No scleral icterus. Conjunctiva/lids normal. Vision intact grossly. Pupils equal/symmetric, reactive bilaterally. ENT: External ears/nose without defect, scars, or masses. Hearing grossly intact. No facial deformity. Lips normal, normal dentition. Neck: No masses. Trachea midline. No crepitus. Thyroid not enlarged. Cardiovascular: Normal rate. No peripheral edema. Abdominal aorta normal size to palpation. Pulmonary/Chest: Effort normal. No respiratory distress. No wheezes. No use of accessory muscles. Musculoskeletal: Normal range of motion of head/neck, without deformity, pain, or crepitus, with normal strength and tone. Normal gait. Nails without clubbing or cyanosis. Neurological: Alert and oriented to person, place, and time. No gross deficits. Sensation intact. Skin: Skin is dry. No rashes noted. No pallor. No induration of nodules. Psychiatric: Normal mood and affect. Behavior normal. Oriented to person, place, and time. Judgment and insight reasonable. Abdominal/wound: Soft, nontender, nondistended    During my initial anorectal exam I was unable to obtain clear visualization of the anal canal, so I determined an anoscopy would be required. I discussed with the patient and they agreed to proceed with anoscopy. ANOSCOPY:    Chaperone/MA present in room during entire exam. Patient was placed in knee-chest position or left side down position depending on comfort. Exam table manipulated for proper visualization and lighting.  Buttocks spread. Inspection reveals: no perianal skin lesions, excoriation, or skin tags. Digital exam performed with lubricated finger revealing: no masses, induration, or tenderness. No gross blood. Normal tone. Lubricated anoscope inserted gently into anus and withdrawn for visualization of distal rectum and anal canal: Mucosa appeared normal, without masses or evidence of proctitis. Mild hemorrhoids. Healed anal fissure    Anoscope removed without difficulty. Labs reviewed: none     Imaging reviewed: none    Assessment/Plan:       A/P:  Established problem(s): Perianal abscess, anal fissure, hemorrhoids  Additional workup/treatment planned: None  Risk of complications/morbidity: Moderate    Overall symptomatically much better. Though still having a bit of thin stools similar to his last visit, his bleeding and pain have stopped. I discussed continuing with medical and conservative management with fiber supplementation, dietary changes. He can continue with prescription calcium channel blocker ointment as needed. DISPOSITION:  F/u with me PRN    My findings will be relayed to consulting practitioner or PCP via Epic note    Note completed using dictation software, please excuse any errors.     Electronically signed by Kita Barber MD on 11/18/2020 at 2:31 PM

## 2020-12-18 ENCOUNTER — OFFICE VISIT (OUTPATIENT)
Dept: DERMATOLOGY | Age: 40
End: 2020-12-18
Payer: COMMERCIAL

## 2020-12-18 VITALS — TEMPERATURE: 98.6 F

## 2020-12-18 PROCEDURE — 99214 OFFICE O/P EST MOD 30 MIN: CPT | Performed by: DERMATOLOGY

## 2020-12-18 PROCEDURE — 17110 DESTRUCTION B9 LES UP TO 14: CPT | Performed by: DERMATOLOGY

## 2020-12-18 RX ORDER — BETAMETHASONE DIPROPIONATE 0.05 %
OINTMENT (GRAM) TOPICAL
Qty: 45 G | Refills: 1 | Status: SHIPPED | OUTPATIENT
Start: 2020-12-18

## 2020-12-18 NOTE — PROGRESS NOTES
[facility]) Dermatology  [place], [place], [address]       Franco Palacios  1980    41 y.o. male     Date of Visit: 2020    Chief Complaint:   Chief Complaint   Patient presents with    Skin Exam     mole- area of concern rt. side and itchy        I was asked to see this patient by Dr. Acuña ref. provider found. History of Present Illness:  Franco Palacios is a 41 y.o. male who presents with the chief complaint of the followin. Total body skin cancer screening exam.History BCC, denies recurrence. 2. Many year history of multiple nevi on the head/neck, trunk and extremities, all present for many years. Denies new moles. Denies moles changing in size, shape, color. None associated w/ pain, bleeding, pruritus. 3.  New complaint of chronic several year history recurring spots and blisters that come and go medial soles of feet. Occasionally pruritic. Does admit to history of flexural eczema in childhood. Does not moisturize skin including feet regularly. Denies a family history of psoriasi but states children have a history of eczema. No prior topical steroid or antifungal treatment attempted for foot problem. Denies involvement to hands. Does admit to excessive perspiration to feet occasionally and states blisters seem to worsen when feet sweat. 4.  Complains of a new raised rough feeling growth to his right flank, denies any pain complaining, pruritus. History of seborrheic keratosis located to left lateral cheek, patient states it is itchy today and request cryotherapy. Admits to sun exposure in youth without wearing sunscreen, hats, or protective clothing. wears sunscreen occasionally when outdoors for long periods of time, denies wearing sun hats. Review of Systems:  Constitutional: Reports general sense of well-being   Skin: No new or changing moles, no tendency to develop thick scars, no interval of severe sunburns  Heme: No abnormal bruising or bleeding. Past Skin Hx:  2/2018-Right superior medial chest-history of nodular BCC treated with ED&C  Patient denies past history of melanoma,dysplastic nevi, or chronic skin rashes.     PFHx: Mother father-history of basal cell carcinoma       ADDITIONAL HISTORY:    I have reviewed past medical and surgical histories, current medications, allergies, social and family histories as documented in the patient's electronic medical record. Family History   Problem Relation Age of Onset    Cancer Mother         Thinks BCC    Cancer Father         thinks [facility]     Past Medical History:   Diagnosis Date    Allergic rhinitis, seasonal     Basal cell carcinoma     chest    Keratosis pilaris      History reviewed. No pertinent surgical history.     Allergies   Allergen Reactions    Amoxicillin Hives     Outpatient Medications Marked as Taking for the 12/18/20 encounter (Office Visit) with Trav Crabtree, DO   Medication Sig Dispense Refill    betamethasone dipropionate (DIPROLENE) 0.05 % ointment Apply thin layer to affected area(s) on feet BID for up to 2 weeks then stop application for 2 weeks as needed flares 45 g 1    fluticasone (FLONASE) 50 MCG/ACT nasal spray PLACE ONE SPRAY IN EACH NOSTRIL ONCE DAILY 16 g 5    fexofenadine (ALLEGRA ALLERGY) 180 MG tablet Take 180 mg by mouth daily      Multiple Vitamins-Minerals (ONE DAILY MULTIVITAMIN MEN) TABS Take by mouth         Social History:   Social History     Socioeconomic History    Marital status:      Spouse name: Latonya Woods Number of children: 3    Years of education: Not on file    Highest education level: Not on file   Occupational History    Occupation:     Social Needs    Financial resource strain: Not on file    Food insecurity     Worry: Not on file     Inability: Not on file   [employer] needs     Medical: Not on file     Non-medical: Not on file   Tobacco Use    Smoking status: Never Smoker  Smokeless tobacco: Never Used    Tobacco comment: counseled on tobacco exposure avoidance   Substance and Sexual Activity    Alcohol use: No     Alcohol/week: 0.0 standard drinks    Drug use: No    Sexual activity: Yes     Partners: Female   Lifestyle    Physical activity     Days per week: Not on file     Minutes per session: Not on file    Stress: Not on file   Relationships    Social connections     Talks on phone: Not on file     Gets together: Not on file     Attends Sikh service: Not on file     Active member of club or organization: Not on file     Attends meetings of clubs or organizations: Not on file     Relationship status: Not on file    Intimate partner violence     Fear of current or ex partner: Not on file     Emotionally abused: Not on file     Physically abused: Not on file     Forced sexual activity: Not on file   Other Topics Concern    Not on file   Social History Narrative    Not on file       Physical Examination     The following were examined and determined to be normal: Psych/Neuro, Scalp/hair, Conjunctivae/eyelids, Gums/teeth/lips, Neck, Breast/axilla/chest, Abdomen, Back, RUE, LUE and Nails/digits. buttocks. Areas covered by underwear garment(s) not examined. The following were examined and determined to be abnormal: Head/face, RLE and LLE. Arce phototype: 2    -Constitutional: Well appearing, no acute distress  -Neurological: Alert and oriented X 3  -Mood and Affect: Pleasant  Total body skin exam performed, areas examined listed above:   1. Bilateral medial plantar surfaces of feet- symmetric distribution few pink macules with collarette of scale, no pustules or vesicles, no rash or moccasin distribution. Mild perspiration noted to feet bilaterally. Dry rough feeling skin to feet bilaterally.   Bilateral hands with rough dry xerotic skin but no rash 2. Left lateral cheek and right flank- well-defined \"stuck-on\" verrucous tan-brown papule(s) w/ surrounding subtle erythema  3. Scattered on the head,neck, trunk and extremities are multiple well-defined round and oval symmetric smoothly-bordered uniformly brown macules and papules. no change in size/shape/color of any lesions; no bleeding lesions. 4. Right superior medial chest-well-healed scar at site of prior BCC, clear  Assessment and Plan     1. Dyshidrotic eczema    2. Inflamed seborrheic keratosis    3. Multiple benign nevi    4. History of basal cell carcinoma        1. Dyshidrotic eczema  Appears to favor dyshidrotic eczema given patient's clinical history  --Pt was educated re: chronicity, use of topical steroids for flares, importance of dry skin care regimen, break scratch-itch cycle  -Coupon given for CeraVe thick moisturizing cream to apply twice daily and after every shower.  -Discussed minimizing perspiration to feet including wearing 100% cotton socks only and allowing feet to be exposed to dry air when home. - betamethasone dipropionate (DIPROLENE) 0.05 % ointment; Apply thin layer to affected area(s) on feet BID for up to 2 weeks then stop application for 2 weeks as needed flares    -Edu re: sparing use, atrophy, striae, hypopigmentation, telangiectasias.    -Call office if worsens or does not improve despite treatment    2. Inflamed seborrheic keratosis  Patient educated that seborrheic keratoses have no malignant potential, due to localized irritation/discomfort, pt elects for cryotherapy treatment today:   -1 lesion(s) treated w/ liquid nitrogen x 1cycles - 3 seconds each. Edu re: risk of blister formation, discomfort, scar, hypopigmentation, multiple treatments may be needed, risk of recurrence.  Discussed wound care and instructions given to take home.  -Advised patient to call the office if recurs or worsens despite treatment.  -Patient declined cryotherapy to ISK located on right flank 3. Multiple benign nevi  Benign acquired melanocytic nevi  -Recommend monthly self skin exams   -Educated regarding the ABCDEs of melanoma detection   -Call for any new/changing moles or concerning lesions  -Reviewed sun protective behavior -- sun avoidance during the peak hours of the day, sun-protective clothing (including hat and sunglasses), sunscreen use (water resistant, broad spectrum, SPF at least 30, need for reapplication every 1.5 to 2 hours), avoidance of tanning beds   -Plan: Observation with annual skin checks (earlier if indicated) performed in office to monitor current nevi and to assess for new lesions. 4. History of basal cell carcinoma  No evidence of recurrence   Skin Care:  Skin cancer is primarily a result of exposure to UV light. Patients with a history of non-melanoma skin cancer should wear sunscreen, sun protective clothing, wide-brimmed hats and practice sun avoidance as much as possible to decrease their risk of developing new skin cancers. Expectations:  Scars from where skin cancers have been removed should be monitored for recurrences. Contact office:  If the patient notices discoloration, bleeding, or a bump arising in a previously healed scar or if a new lesion develops elsewhere. Return to Clinic:  1 year skin exam; as needed  Discussed plan with patient and/or primary caretaker. Patient to call clinic with any questions / concerns. Reviewed proper use and side effects of treatment(s) and/or medication(s) with patient and/or primary caretaker. AVS provided or is available on [employer]     Note is transcribed using voice recognition software. Inadvertent computerized transcription errors may be present.

## 2020-12-18 NOTE — PATIENT INSTRUCTIONS
Sun Protection Tips    Apply broad spectrum water resistant sunscreen with an SPF of at least 30 to exposed areas of the skin. Dont forget the ears and lips! Remember to reapply sunscreen about every 2 hours and after swimming or sweating. Wear sun protective clothing. Swim shirts (aka. rash guards) are a great idea and negates the need to reapply sunscreen in those areas. Seek the shade whenever possible especially between the hours of 10am and 4pm when the suns rays are the strongest.     Avoid tanning beds          Wear a wide brim hat while in the sun    Cryosurgery (Freezing) Wound Care Instructions    AFTER THE PROCEDURE:   ? You will notice swelling and redness around the site. This is normal.   ? You may experience a sharp or sore feeling for the next several days. For this discomfort, you may take acetaminophen (Tylenol©). ? A blister may develop at the treated area, sometimes as soon as by the end of the day. After several days, the blister will subside and a scab will form. ? If the area is bumped or traumatized during the first few days following freezing, you may develop bleeding into the blister, forming a blood blister. This is nothing to be alarmed about. ? If the blister is tense, uncomfortable, or much larger than the site that was frozen, you may pop the blister along its edge with a sterile needle (boiled, heated under a flame, or cleaned with alcohol) to allow the fluid to drain out. If the blister does not bother you, no treatment is needed. ? Do NOT peel off the top of the blister roof. It will act as a dressing on top of your wound. WOUND CARE:   ? You may shower or bathe as usual, but avoid scrubbing the areas that have been frozen. ? Cleanse the site twice a day with mild soapy water, and then apply a thin film of white petrolatum (Vaseline©). ? You do not need to cover the area, but can if you prefer. ? Do NOT allow the site to become dry or crusted, or attempt to dry it out with rubbing alcohol or hydrogen peroxide. ? Continue this regimen until the area is pink and healed. Depending on the size and location of your cryosurgery site, healing may take 2 to 4 weeks. ? The area may continue to be pink for several weeks, and over the next few months may become darker or lighter than the surrounding skin. This may be a permanent change. Thanks for your visit! Feel free to send  Dr. Katlyn Rocha assistantRadha, a [employer] message for any questions, concerns or  to schedule your cosmetic procedures.

## 2021-11-28 ENCOUNTER — TELEPHONE (OUTPATIENT)
Dept: FAMILY MEDICINE CLINIC | Age: 41
End: 2021-11-28

## 2021-11-28 RX ORDER — PREDNISONE 20 MG/1
40 TABLET ORAL DAILY
Qty: 14 TABLET | Refills: 0 | Status: SHIPPED | OUTPATIENT
Start: 2021-11-28 | End: 2021-12-05

## 2021-12-02 NOTE — TELEPHONE ENCOUNTER
Known covid 19 infection. + cough/fatigue/nausea. Feels wretched. Mildly SOB with activity. No longer febrile. Called in steroids for symptoms. Advised to seek medical attn if worsens.

## 2021-12-13 ENCOUNTER — OFFICE VISIT (OUTPATIENT)
Dept: FAMILY MEDICINE CLINIC | Age: 41
End: 2021-12-13
Payer: COMMERCIAL

## 2021-12-13 VITALS
HEIGHT: 68 IN | OXYGEN SATURATION: 99 % | SYSTOLIC BLOOD PRESSURE: 136 MMHG | DIASTOLIC BLOOD PRESSURE: 86 MMHG | WEIGHT: 208 LBS | BODY MASS INDEX: 31.52 KG/M2 | HEART RATE: 90 BPM

## 2021-12-13 DIAGNOSIS — Z00.00 WELL ADULT EXAM: Primary | ICD-10-CM

## 2021-12-13 DIAGNOSIS — R73.03 PREDIABETES: ICD-10-CM

## 2021-12-13 DIAGNOSIS — E66.09 CLASS 1 OBESITY DUE TO EXCESS CALORIES WITHOUT SERIOUS COMORBIDITY WITH BODY MASS INDEX (BMI) OF 31.0 TO 31.9 IN ADULT: ICD-10-CM

## 2021-12-13 PROCEDURE — 90471 IMMUNIZATION ADMIN: CPT | Performed by: FAMILY MEDICINE

## 2021-12-13 PROCEDURE — 90715 TDAP VACCINE 7 YRS/> IM: CPT | Performed by: FAMILY MEDICINE

## 2021-12-13 PROCEDURE — 99396 PREV VISIT EST AGE 40-64: CPT | Performed by: FAMILY MEDICINE

## 2021-12-13 ASSESSMENT — ENCOUNTER SYMPTOMS
RESPIRATORY NEGATIVE: 1
ALLERGIC/IMMUNOLOGIC NEGATIVE: 1
GASTROINTESTINAL NEGATIVE: 1
EYES NEGATIVE: 1

## 2021-12-13 ASSESSMENT — PATIENT HEALTH QUESTIONNAIRE - PHQ9
SUM OF ALL RESPONSES TO PHQ QUESTIONS 1-9: 0
1. LITTLE INTEREST OR PLEASURE IN DOING THINGS: 0
2. FEELING DOWN, DEPRESSED OR HOPELESS: 0
SUM OF ALL RESPONSES TO PHQ9 QUESTIONS 1 & 2: 0

## 2021-12-13 NOTE — PROGRESS NOTES
2021    Blood pressure 136/86, pulse 90, height 5' 8\" (1.727 m), weight 208 lb (94.3 kg), SpO2 99 %. Dez Messer (:  1980) is a 35 y.o. male, here for evaluation of the following medical concerns:    Chief Complaint   Patient presents with    Annual Exam     well check for work ins. haad lab done  already     Had covid last month    Max wt 240 lbs. Did weight watchers about 5 yrs ago, got down to 170 lbs. Hovering at 190 lbs for several yrs. Increased to >200 with pandemic due to lifestyle changes (to the negative). He is active playing  soccer games once a week. It is active, but not ultra competitive. Also walks on treadmill 20 min a day, hikes. Oral meds: allegra for OSKAR. flonase also. Steroid ointment prn for plantar eczema (dishydrotic eczema)    Labs done 21.  (before COVID infection). a1c was 5.7, FBG normal, HDL 51, . Dental: sees dentist at least annually. Sees optometrist at least every 2 yrs. Patient Active Problem List   Diagnosis    Skin lesion    Keratosis pilaris    Allergic rhinitis, seasonal    Flatulence        Body mass index is 31.63 kg/m². Wt Readings from Last 3 Encounters:   21 208 lb (94.3 kg)   20 208 lb (94.3 kg)   20 201 lb (91.2 kg)       BP Readings from Last 3 Encounters:   21 136/86   20 135/87   20 (!) 135/94       Allergies   Allergen Reactions    Amoxicillin Hives       Prior to Visit Medications    Medication Sig Taking?  Authorizing Provider   betamethasone dipropionate (DIPROLENE) 0.05 % ointment Apply thin layer to affected area(s) on feet BID for up to 2 weeks then stop application for 2 weeks as needed flares Yes Veronica Moran,    fluticasone (FLONASE) 50 MCG/ACT nasal spray PLACE ONE SPRAY IN EACH NOSTRIL ONCE DAILY Yes Sanchez Miller MD   fexofenadine [facility] ALLERGY) 180 MG tablet Take 180 mg by mouth daily Yes Historical Provider, MD   Multiple Vitamins-Minerals (ONE DAILY MULTIVITAMIN MEN) TABS Take by mouth Yes Historical Provider, MD   dicyclomine (BENTYL) 20 MG tablet TAKE ONE TABLET BY MOUTH THREE TIMES A DAY AS NEEDED FOR GAS  Dalia Pino MD        Social History     Tobacco Use    Smoking status: Never Smoker    Smokeless tobacco: Never Used    Tobacco comment: counseled on tobacco exposure avoidance   Vaping Use    Vaping Use: Never used   Substance Use Topics    Alcohol use: No     Alcohol/week: 0.0 standard drinks    Drug use: No       Review of Systems   Constitutional: Negative. HENT: Negative. Eyes: Negative. Respiratory: Negative. Cardiovascular: Negative. Gastrointestinal: Negative. Endocrine: Negative. Genitourinary: Negative. Musculoskeletal: Negative. Skin: Negative. Allergic/Immunologic: Negative. Neurological: Negative. Hematological: Negative. Psychiatric/Behavioral: Negative. Physical Exam  Vitals and nursing note reviewed. Constitutional:       General: He is not in acute distress. Appearance: Normal appearance. He is well-developed. He is not diaphoretic. HENT:      Head: Normocephalic and atraumatic. Right Ear: Tympanic membrane, ear canal and external ear normal.      Left Ear: Tympanic membrane, ear canal and external ear normal.      Nose: Nose normal. No congestion or rhinorrhea. Mouth/Throat:      Mouth: Mucous membranes are moist.      Pharynx: Oropharynx is clear. No oropharyngeal exudate or posterior oropharyngeal erythema. Eyes:      General: No scleral icterus. Right eye: No discharge. Left eye: No discharge. Conjunctiva/sclera: Conjunctivae normal.      Pupils: Pupils are equal, round, and reactive to light. Cardiovascular:      Rate and Rhythm: Normal rate and regular rhythm. Pulses: Normal pulses. Heart sounds: Normal heart sounds. No murmur heard.       Pulmonary:      Effort: Pulmonary effort is normal. No respiratory distress. Breath sounds: Normal breath sounds. No wheezing or rales. Abdominal:      General: Bowel sounds are normal. There is no distension. Palpations: Abdomen is soft. There is no mass. Tenderness: There is no abdominal tenderness. There is no guarding or rebound. Hernia: No hernia is present. Musculoskeletal:         General: No swelling. Cervical back: Normal range of motion and neck supple. Right lower leg: No edema. Left lower leg: No edema. Lymphadenopathy:      Cervical: No cervical adenopathy. Skin:     General: Skin is warm and dry. Capillary Refill: Capillary refill takes less than 2 seconds. Neurological:      General: No focal deficit present. Mental Status: He is alert and oriented to person, place, and time. Mental status is at baseline. Psychiatric:         Mood and Affect: Mood normal.         Behavior: Behavior normal.         Thought Content: Thought content normal.         Judgment: Judgment normal.         ASSESSMENT/PLAN:    1. Well adult exam  Discussed healthy diet/ regular exercise, dental care (at least yearly), vision screening (at least q2 yrs), sunscreen, seatbelt use, smoke alarms; anticip guidance given. tdap given today. 2. Prediabetes a1c 5.7 2021  discussed diet/exercise/wt loss; encouraged his efforts. 3. Class 1 obesity due to excess calories without serious comorbidity with body mass index (BMI) of 31.0 to 31.9 in adult  The patient is asked to make an attempt to improve diet and exercise patterns to aid in medical management of this problem. Return in about 1 year (around 12/13/2022) for Wellness exam.    An  [employer]ature was used to authenticate this note.     --Sivan Nicole MD on 12/13/2021 at 1:57 PM

## 2022-11-03 LAB
A/G RATIO, EXTERNAL: 2.1
ALBUMIN SERPL-MCNC: NORMAL G/DL
ALBUMIN, EXTERNAL: 4.7
ALK PHOS, EXTERNAL: 73
ALP BLD-CCNC: 64 U/L
ALT SERPL-CCNC: 22 U/L
ANION GAP SERPL CALCULATED.3IONS-SCNC: NORMAL MMOL/L
ANION GAP, EXTERNAL: NORMAL
AST SERPL-CCNC: 31 U/L
BASOPHILS ABSOLUTE: 0.1 /ΜL
BASOPHILS RELATIVE PERCENT: 1 %
BILI DIRECT, EXTERNAL: NORMAL
BILI TOTAL, EXTERNAL: 0.3
BILIRUB SERPL-MCNC: 0.3 MG/DL (ref 0.1–1.4)
BUN BLDV-MCNC: 11 MG/DL
BUN, EXTERNAL: 14
BUN/CREATININE RATIO, EXTERNAL: 14
C-REACTIVE PROTEIN: 0.39
CALCIUM SERPL-MCNC: 10 MG/DL
CALCIUM, EXTERNAL: 9.5
CALCIUM, ION, EXTERNAL: NORMAL
CHLORIDE BLD-SCNC: 103 MMOL/L
CHLORIDE, EXTERNAL: 101
CHOLESTEROL, TOTAL: 172 MG/DL
CHOLESTEROL/HDL RATIO: 4
CO2, EXTERNAL: NORMAL
CO2: NORMAL
CREAT SERPL-MCNC: 0.98 MG/DL
CREATININE, EXTERNAL: 1
EGFR IF AFA, EXTERNAL: NORMAL
EGFR IF NONAFRICAN AMERICAN: 95
EGFR: 97
EOSINOPHILS ABSOLUTE: 0.6 /ΜL
EOSINOPHILS RELATIVE PERCENT: 8 %
GLOBULIN, EXTERNAL: 2.2
GLUCOSE BLD-MCNC: 91 MG/DL
GLUCOSE SER, EXTERNAL: 82
HCT VFR BLD CALC: 47.4 % (ref 41–53)
HDLC SERPL-MCNC: 43 MG/DL (ref 35–70)
HEMOGLOBIN: 15.2 G/DL (ref 13.5–17.5)
LDL CHOLESTEROL CALCULATED: 114 MG/DL (ref 0–160)
LYMPHOCYTES ABSOLUTE: 3 /ΜL
LYMPHOCYTES RELATIVE PERCENT: 38 %
MCH RBC QN AUTO: 29.2 PG
MCHC RBC AUTO-ENTMCNC: 32.1 G/DL
MCV RBC AUTO: 91 FL
MONOCYTES ABSOLUTE: 0.5 /ΜL
MONOCYTES RELATIVE PERCENT: 6 %
NEUTROPHILS ABSOLUTE: 3.8 /ΜL
NEUTROPHILS RELATIVE PERCENT: 47 %
NONHDLC SERPL-MCNC: ABNORMAL MG/DL
PLATELET # BLD: 285 K/ΜL
PMV BLD AUTO: ABNORMAL FL
POTASSIUM SERPL-SCNC: 4.6 MMOL/L
POTASSIUM, EXTERNAL: 4.2
PROSTATE SPECIFIC ANTIGEN: 1.5 NG/ML
PROTEIN TOT, EXTERNAL: 6.9
RBC # BLD: 5.21 10^6/ΜL
SGOT (AST), EXTERNAL: 22
SGPT (ALT), EXTERNAL: 17
SODIUM BLD-SCNC: 141 MMOL/L
SODIUM, EXTERNAL: 140
TOTAL PROTEIN: NORMAL
TRIGL SERPL-MCNC: 82 MG/DL
VLDLC SERPL CALC-MCNC: ABNORMAL MG/DL
WBC # BLD: 8 10^3/ML

## 2022-11-08 LAB
AVERAGE GLUCOSE: NORMAL
HBA1C MFR BLD: 5.7 %
PROSTATE SPECIFIC ANTIGEN: 1.1 NG/ML

## 2022-11-23 ENCOUNTER — E-VISIT (OUTPATIENT)
Dept: FAMILY MEDICINE CLINIC | Age: 42
End: 2022-11-23
Payer: COMMERCIAL

## 2022-11-23 DIAGNOSIS — B96.89 ACUTE BACTERIAL SINUSITIS: Primary | ICD-10-CM

## 2022-11-23 DIAGNOSIS — J01.90 ACUTE BACTERIAL SINUSITIS: Primary | ICD-10-CM

## 2022-11-23 PROCEDURE — 99421 OL DIG E/M SVC 5-10 MIN: CPT | Performed by: FAMILY MEDICINE

## 2022-11-23 RX ORDER — AZITHROMYCIN 250 MG/1
TABLET, FILM COATED ORAL
Qty: 1 PACKET | Refills: 0 | Status: SHIPPED | OUTPATIENT
Start: 2022-11-23

## 2022-11-23 RX ORDER — AZITHROMYCIN 250 MG/1
TABLET, FILM COATED ORAL
Qty: 1 PACKET | Refills: 0 | Status: SHIPPED | OUTPATIENT
Start: 2022-11-23 | End: 2022-11-23 | Stop reason: SDUPTHER

## 2022-11-23 ASSESSMENT — LIFESTYLE VARIABLES: SMOKING_STATUS: NO, I'VE NEVER SMOKED

## 2022-12-20 ENCOUNTER — OFFICE VISIT (OUTPATIENT)
Dept: FAMILY MEDICINE CLINIC | Age: 42
End: 2022-12-20
Payer: COMMERCIAL

## 2022-12-20 VITALS
DIASTOLIC BLOOD PRESSURE: 82 MMHG | BODY MASS INDEX: 29.46 KG/M2 | RESPIRATION RATE: 16 BRPM | SYSTOLIC BLOOD PRESSURE: 118 MMHG | WEIGHT: 194.4 LBS | HEIGHT: 68 IN | OXYGEN SATURATION: 98 % | HEART RATE: 68 BPM

## 2022-12-20 DIAGNOSIS — R73.03 PREDIABETES: ICD-10-CM

## 2022-12-20 DIAGNOSIS — Z00.00 WELL ADULT EXAM: Primary | ICD-10-CM

## 2022-12-20 PROBLEM — R14.3 FLATULENCE: Status: RESOLVED | Noted: 2017-09-13 | Resolved: 2022-12-20

## 2022-12-20 PROCEDURE — 99396 PREV VISIT EST AGE 40-64: CPT | Performed by: FAMILY MEDICINE

## 2022-12-20 SDOH — ECONOMIC STABILITY: FOOD INSECURITY: WITHIN THE PAST 12 MONTHS, THE FOOD YOU BOUGHT JUST DIDN'T LAST AND YOU DIDN'T HAVE MONEY TO GET MORE.: NEVER TRUE

## 2022-12-20 SDOH — ECONOMIC STABILITY: FOOD INSECURITY: WITHIN THE PAST 12 MONTHS, YOU WORRIED THAT YOUR FOOD WOULD RUN OUT BEFORE YOU GOT MONEY TO BUY MORE.: NEVER TRUE

## 2022-12-20 ASSESSMENT — SOCIAL DETERMINANTS OF HEALTH (SDOH): HOW HARD IS IT FOR YOU TO PAY FOR THE VERY BASICS LIKE FOOD, HOUSING, MEDICAL CARE, AND HEATING?: NOT HARD AT ALL

## 2022-12-20 ASSESSMENT — ENCOUNTER SYMPTOMS
GASTROINTESTINAL NEGATIVE: 1
ALLERGIC/IMMUNOLOGIC NEGATIVE: 1
RESPIRATORY NEGATIVE: 1
EYES NEGATIVE: 1

## 2022-12-20 ASSESSMENT — PATIENT HEALTH QUESTIONNAIRE - PHQ9
SUM OF ALL RESPONSES TO PHQ QUESTIONS 1-9: 0
2. FEELING DOWN, DEPRESSED OR HOPELESS: 0
SUM OF ALL RESPONSES TO PHQ9 QUESTIONS 1 & 2: 0
1. LITTLE INTEREST OR PLEASURE IN DOING THINGS: 0
SUM OF ALL RESPONSES TO PHQ QUESTIONS 1-9: 0

## 2022-12-20 NOTE — PROGRESS NOTES
2022    Blood pressure 118/82, pulse 68, resp. rate 16, height 5' 8\" (1.727 m), weight 194 lb 6.4 oz (88.2 kg), SpO2 98 %. Rupert Mei (:  1980) is a 38 y.o. male, here for evaluation of the following medical concerns:    Chief Complaint   Patient presents with    Annual Exam     Annual Exam.     Here for annual check up. He is happy that he has lost weight. Does weight watchers via [employer]. Plans to get to 185 lbs. 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 Walks, hikes. Daily meds: allegra/Flonase/MVI. Gets about 1 sinus infection a year. Wants me to look at a couple moles. Has seen derm before, to have facial lesion frozen (SK, does return). One BCC on neck 2 yrs ago. Gets labs done at work. Has hx prediabetes. Recent a1c 5.7 (11/3/22)    Trigs 82  HDL 43  . He has sought to eat less AND reduce carbs. Patient Active Problem List   Diagnosis    Skin lesion    Keratosis pilaris    Allergic rhinitis, seasonal    Flatulence        Body mass index is 29.56 kg/m². Wt Readings from Last 3 Encounters:   22 194 lb 6.4 oz (88.2 kg)   21 208 lb (94.3 kg)   20 208 lb (94.3 kg)       BP Readings from Last 3 Encounters:   22 118/82   21 136/86   20 135/87       Allergies   Allergen Reactions    Amoxicillin Hives       Prior to Visit Medications    Medication Sig Taking?  Authorizing Provider   betamethasone dipropionate (DIPROLENE) 0.05 % ointment Apply thin layer to affected area(s) on feet BID for up to 2 weeks then stop application for 2 weeks as needed flares Yes Susan Ingram DO   fluticasone (FLONASE) 50 MCG/ACT nasal spray PLACE ONE SPRAY IN EACH NOSTRIL ONCE DAILY Yes Reilly Hutchison MD   fexofenadine (ALLEGRA) 180 MG tablet Take 180 mg by mouth daily Yes Historical Provider, MD   Multiple Vitamins-Minerals (ONE DAILY MULTIVITAMIN MEN) TABS Take by mouth Yes Historical Provider, MD   azithromycin (ZITHROMAX Z-ELVA) 250 MG tablet Take as directed on packet. Johanna Oneal MD        Social History     Tobacco Use    Smoking status: Never    Smokeless tobacco: Never    Tobacco comments:     counseled on tobacco exposure avoidance   Vaping Use    Vaping Use: Never used   Substance Use Topics    Alcohol use: No     Alcohol/week: 0.0 standard drinks    Drug use: No       Review of Systems   Constitutional: Negative. HENT: Negative. Eyes: Negative. Respiratory: Negative. Cardiovascular: Negative. Gastrointestinal: Negative. Endocrine: Negative. Genitourinary: Negative. Musculoskeletal: Negative. Skin: Negative. Allergic/Immunologic: Negative. Neurological: Negative. Hematological: Negative. Psychiatric/Behavioral: Negative. Physical Exam  Vitals and nursing note reviewed. Constitutional:       General: He is not in acute distress. Appearance: Normal appearance. He is well-developed. He is not diaphoretic. HENT:      Head: Normocephalic and atraumatic. Right Ear: Tympanic membrane, ear canal and external ear normal.      Left Ear: Tympanic membrane, ear canal and external ear normal.      Nose: Nose normal. No congestion or rhinorrhea. Mouth/Throat:      Mouth: Mucous membranes are moist.      Pharynx: Oropharynx is clear. No oropharyngeal exudate or posterior oropharyngeal erythema. Eyes:      General: No scleral icterus. Right eye: No discharge. Left eye: No discharge. Conjunctiva/sclera: Conjunctivae normal.      Pupils: Pupils are equal, round, and reactive to light. Cardiovascular:      Rate and Rhythm: Normal rate and regular rhythm. Heart sounds: Normal heart sounds. No murmur heard. Pulmonary:      Effort: Pulmonary effort is normal. No respiratory distress. Breath sounds: Normal breath sounds. No wheezing or rales. Abdominal:      General: Bowel sounds are normal. There is no distension. Palpations: Abdomen is soft.  There is no mass.      Tenderness: There is no abdominal tenderness. There is no guarding or rebound. Hernia: No hernia is present. Musculoskeletal:         General: No swelling. Cervical back: Normal range of motion and neck supple. Right lower leg: No edema. Left lower leg: No edema. Lymphadenopathy:      Cervical: No cervical adenopathy. Skin:     General: Skin is warm and dry. Capillary Refill: Capillary refill takes less than 2 seconds. Neurological:      General: No focal deficit present. Mental Status: He is alert and oriented to person, place, and time. Mental status is at baseline. Psychiatric:         Mood and Affect: Mood normal.         Behavior: Behavior normal.         Thought Content: Thought content normal.         Judgment: Judgment normal.       ASSESSMENT/PLAN:    1. Well adult exam  - Discussed healthy diet/ regular exercise, dental care (at least yearly), vision screening (at least q2 yrs), sunscreen, seatbelt use, smoke alarms; anticip guidance given. - vaccines recommended: flu. (Declines)    2. Prediabetes  - discussed diet/exercise at length. Monitor yearly. Return in about 1 year (around 12/20/2023) for Wellness exam.    An  [employer]ature was used to authenticate this note.     --Devan Carrion MD on 12/20/2022 at 11:52 AM

## 2023-01-06 ENCOUNTER — E-VISIT (OUTPATIENT)
Dept: FAMILY MEDICINE CLINIC | Age: 43
End: 2023-01-06
Payer: COMMERCIAL

## 2023-01-06 DIAGNOSIS — B96.89 ACUTE BACTERIAL SINUSITIS: Primary | ICD-10-CM

## 2023-01-06 DIAGNOSIS — J01.90 ACUTE BACTERIAL SINUSITIS: Primary | ICD-10-CM

## 2023-01-06 PROCEDURE — 99421 OL DIG E/M SVC 5-10 MIN: CPT | Performed by: FAMILY MEDICINE

## 2023-01-06 RX ORDER — DOXYCYCLINE HYCLATE 100 MG
100 TABLET ORAL 2 TIMES DAILY
Qty: 28 TABLET | Refills: 0 | Status: SHIPPED | OUTPATIENT
Start: 2023-01-06 | End: 2023-01-20

## 2023-01-06 ASSESSMENT — LIFESTYLE VARIABLES: SMOKING_STATUS: NO, I'VE NEVER SMOKED

## 2023-01-22 ENCOUNTER — HOSPITAL ENCOUNTER (EMERGENCY)
Age: 43
Discharge: HOME | End: 2023-01-22
Attending: EMERGENCY MEDICINE
Payer: COMMERCIAL

## 2023-01-22 VITALS
RESPIRATION RATE: 18 BRPM | WEIGHT: 200.3 LBS | SYSTOLIC BLOOD PRESSURE: 130 MMHG | BODY MASS INDEX: 28.67 KG/M2 | HEIGHT: 70 IN | OXYGEN SATURATION: 98 % | HEART RATE: 73 BPM | DIASTOLIC BLOOD PRESSURE: 81 MMHG | TEMPERATURE: 97.6 F

## 2023-01-22 DIAGNOSIS — S61.012A LACERATION OF LEFT THUMB WITHOUT FOREIGN BODY WITHOUT DAMAGE TO NAIL, INITIAL ENCOUNTER: Primary | ICD-10-CM

## 2023-01-22 PROCEDURE — 99283 EMERGENCY DEPT VISIT LOW MDM: CPT

## 2023-01-22 PROCEDURE — 12001 RPR S/N/AX/GEN/TRNK 2.5CM/<: CPT

## 2023-01-22 RX ORDER — ACETAMINOPHEN 500 MG
500 TABLET ORAL EVERY 6 HOURS PRN
Qty: 30 TABLET | Refills: 0 | Status: SHIPPED | OUTPATIENT
Start: 2023-01-22

## 2023-01-22 RX ORDER — BACITRACIN ZINC AND POLYMYXIN B SULFATE 500; 1000 [USP'U]/G; [USP'U]/G
OINTMENT TOPICAL
Qty: 15 G | Refills: 1 | Status: SHIPPED | OUTPATIENT
Start: 2023-01-22 | End: 2023-01-29

## 2023-01-22 ASSESSMENT — PAIN SCALES - GENERAL
PAINLEVEL_OUTOF10: 4
PAINLEVEL_OUTOF10: 0

## 2023-01-22 ASSESSMENT — LIFESTYLE VARIABLES
HOW MANY STANDARD DRINKS CONTAINING ALCOHOL DO YOU HAVE ON A TYPICAL DAY: PATIENT DOES NOT DRINK
HOW OFTEN DO YOU HAVE A DRINK CONTAINING ALCOHOL: NEVER

## 2023-01-22 ASSESSMENT — PAIN DESCRIPTION - LOCATION: LOCATION: HAND

## 2023-01-22 ASSESSMENT — PAIN - FUNCTIONAL ASSESSMENT
PAIN_FUNCTIONAL_ASSESSMENT: 0-10
PAIN_FUNCTIONAL_ASSESSMENT: 0-10

## 2023-01-22 ASSESSMENT — PAIN DESCRIPTION - ORIENTATION: ORIENTATION: LEFT

## 2023-01-22 NOTE — ED TRIAGE NOTES
Patient came to ER with complaints of left hand laceration on palm. Patient was bending a spackle knife and it broke and cute left palm. Patient has about 2 cm laceration left palm.

## 2023-01-22 NOTE — ED NOTES
Discharge instructions reviewed. Patient verbalized understanding. Prescription x2 sent to pharmacy.        Erich Peace RN  01/22/23 [id number]

## 2023-01-22 NOTE — ED PROVIDER NOTES
[address]     Pt Name: Diane Gonsalez   MRN: [id number]   [place] 1980   Date of evaluation: 1/22/2023   Provider: Efrain Vera MD   PCP: Alessandro Crokcett MD   Note Started: 5:09 PM EST 1/22/23     CHIEF COMPLAINT     Chief Complaint   Patient presents with    Laceration     Left palm attempting to bend spackle knife and it broke. Cut left palm         HISTORY OF PRESENT ILLNESS:  History from : Patient   Limitations to history : None     Diane Gonsalez is a 38 y.o. male who presents the laceration to the thenar eminence of his left thumb. He states he was trying to bend a putty knife when he cut his skin. His last tetanus shot was less than 5 years ago. No foreign body sensation. Pain is local.  Worse with movement. No radiation no numbness. Patient is right-hand dominant    Nursing Notes were all reviewed and agreed with or any disagreements were addressed in the HPI.     ROS: Positives and Pertinent negatives as per HPI. PAST MEDICAL HISTORY     PHYSICAL EXAM:  ED Triage Vitals [01/22/23 1656]   BP Temp Temp Source Heart Rate Resp SpO2 Height Weight   130/81 97.6 °F (36.4 °C) Tympanic 73 18 98 % 5' 10\" (1.778 m) 200 lb 4.8 oz (90.9 kg)        Physical Exam   PHYSICAL EXAM  /81   Pulse 73   Temp 97.6 °F (36.4 °C) (Tympanic)   Resp 18   Ht 5' 10\" (1.778 m)   Wt 200 lb 4.8 oz (90.9 kg)   SpO2 98%   BMI 28.74 kg/m²   GENERAL APPEARANCE: Awake and alert. Well appearing. No acute distress. EYES: No scleral icterus  ENT: Mucous membranes are moist.   CHEST: Equal symmetric chest rise. LUNGS: Breathing is unlabored. Speaking comfortably in full sentences. EXTREMITIES: Left thumb with laceration. He can bend at the MCP and PIP. Sensation light touch intact. Cap refill normal.  SKIN: Warm and dry. No rash. 1 cm laceration to the thenar eminence of the thumb. Not over joint. Approximately 2 mm deep.   When he bends the thumb it does gape. No foreign body appreciated. NEUROLOGICAL: Normal speech and thought      DIAGNOSTIC RESULTS   LABS:   Labs Reviewed - No data to display   When ordered only abnormal lab results are displayed. All other labs were within normal range or not returned as of this dictation. EKG:     RADIOLOGY:    Non-plain film images such as CT, Ultrasound and MRI are read by the radiologist. Plain radiographic images are visualized and preliminarily interpreted by the ED Provider with the below findings:      Interpretation per the Radiologist below, if available at the time of this note:  No results found.        Procedure:  Lac Repair    Date/Time: 1/22/2023 5:10 PM  Performed by: Debbi Seals MD  Authorized by: Debbi Seals MD     Consent:     Consent obtained:  Verbal    Consent given by:  Patient    Risks discussed:  Infection, pain, retained foreign body, poor cosmetic result and poor wound healing  Universal protocol:     Procedure explained and questions answered to patient or proxy's satisfaction: yes    Anesthesia:     Anesthesia method:  Local infiltration    Local anesthetic:  Lidocaine 1% w/o epi  Laceration details:     Location:  Finger    Finger location:  L thumb    Length (cm):  1    Depth (mm):  2  Pre-procedure details:     Preparation:  Patient was prepped and draped in usual sterile fashion  Exploration:     Limited defect created (wound extended): no      Hemostasis achieved with:  Direct pressure    Imaging outcome: foreign body not noted      Wound exploration: wound explored through full range of motion      Wound extent: no fascia violation noted, no foreign bodies/material noted, no muscle damage noted, no tendon damage noted, no underlying fracture noted and no vascular damage noted      Contaminated: no    Treatment:     Area cleansed with:  Povidone-iodine    Amount of cleaning:  Standard    Irrigation solution:  Sterile saline Visualized foreign bodies/material removed: no      Debridement:  None    Undermining:  None    Scar revision: no    Skin repair:     Repair method:  Sutures    Suture size:  4-0    Suture material:  Nylon    Number of sutures:  2  Approximation:     Approximation:  Close  Repair type:     Repair type:  Simple  Post-procedure details:     Dressing:  Antibiotic ointment    Procedure completion:  Tolerated well, no immediate complications      EMERGENCY DEPARTMENT COURSE and DIFFERENTIAL DIAGNOSIS/MDM:     Vitals:    Vitals:    01/22/23 1656   BP: 130/81   Pulse: 73   Resp: 18   Temp: 97.6 °F (36.4 °C)   TempSrc: Tympanic   SpO2: 98%   Weight: 200 lb 4.8 oz (90.9 kg)   Height: 5' 10\" (1.778 m)        Patient was given the following medications:   Medications - No data to display          CC/HPI Summary, DDx, ED Course, and Reassessment:     Patient seen and evaluated. His wound was closed. The patient tolerated it well. CONSULTS: None   Social Determinants: None   Chronic Conditions: NA    Disposition Considerations: None      I am the primary physician of Record. FINAL IMPRESSION    1. Laceration of left thumb without foreign body without damage to nail, initial encounter         DISPOSITION/PLAN   DISPOSITION Decision To Discharge 01/22/2023 05:11:58 PM       PATIENT REFERRED TO:   Please return to the emergency department or see your doctor for suture removal  You can have your sutures removed in the next 7 to 10 days. DISCHARGE MEDICATIONS:   New Prescriptions    ACETAMINOPHEN (TYLENOL) 500 MG TABLET    Take 1 tablet by mouth every 6 hours as needed for Pain    BACITRACIN-POLYMYXIN B (POLYSPORIN) [id number] UNIT/GM OINTMENT    Apply topically 2 times daily.       DISCONTINUED MEDICATIONS:   Discontinued Medications    No medications on file            (Please note that portions of this note were completed with a voice recognition program.  Efforts were made to edit the dictations but occasionally words are mis-transcribed.)     Franco Randall MD (electronically signed)         Franco Randall MD  01/22/23 9535

## 2023-01-31 ENCOUNTER — OFFICE VISIT (OUTPATIENT)
Dept: FAMILY MEDICINE CLINIC | Age: 43
End: 2023-01-31

## 2023-01-31 VITALS
HEART RATE: 69 BPM | RESPIRATION RATE: 16 BRPM | OXYGEN SATURATION: 98 % | BODY MASS INDEX: 28.6 KG/M2 | WEIGHT: 199.8 LBS | HEIGHT: 70 IN | DIASTOLIC BLOOD PRESSURE: 68 MMHG | SYSTOLIC BLOOD PRESSURE: 112 MMHG

## 2023-01-31 DIAGNOSIS — S61.412D LACERATION OF SKIN OF LEFT HAND, SUBSEQUENT ENCOUNTER: Primary | ICD-10-CM

## 2023-01-31 ASSESSMENT — PATIENT HEALTH QUESTIONNAIRE - PHQ9
SUM OF ALL RESPONSES TO PHQ9 QUESTIONS 1 & 2: 0
SUM OF ALL RESPONSES TO PHQ QUESTIONS 1-9: 0
2. FEELING DOWN, DEPRESSED OR HOPELESS: 0
1. LITTLE INTEREST OR PLEASURE IN DOING THINGS: 0
SUM OF ALL RESPONSES TO PHQ QUESTIONS 1-9: 0

## 2023-01-31 NOTE — PROGRESS NOTES
2023    Blood pressure 112/68, pulse 69, resp. rate 16, height 5' 10\" (1.778 m), weight 199 lb 12.8 oz (90.6 kg), SpO2 98 %. Boni Cain (:  1980) is a 38 y.o. male, here for evaluation of the following medical concerns:    Chief Complaint   Patient presents with    Follow-Up from Hospital     Laceration to left hand on 23     F/u on hand laceration- spackle knife broke and cut left palm by thumb heel. On .    2 stitches  Healed well  No d/c  Using SANCHO  No pain      Patient Active Problem List   Diagnosis    Skin lesion    Allergic rhinitis, seasonal    Prediabetes        Body mass index is 28.67 kg/m². Wt Readings from Last 3 Encounters:   23 199 lb 12.8 oz (90.6 kg)   23 200 lb 4.8 oz (90.9 kg)   22 194 lb 6.4 oz (88.2 kg)       BP Readings from Last 3 Encounters:   23 112/68   23 130/81   22 118/82       Allergies   Allergen Reactions    Amoxicillin Hives       Prior to Visit Medications    Medication Sig Taking?  Authorizing Provider   acetaminophen (TYLENOL) 500 MG tablet Take 1 tablet by mouth every 6 hours as needed for Pain Yes Nghia Obrien MD   betamethasone dipropionate (DIPROLENE) 0.05 % ointment Apply thin layer to affected area(s) on feet BID for up to 2 weeks then stop application for 2 weeks as needed flares Yes Candice Grant DO   fluticasone (FLONASE) 50 MCG/ACT nasal spray PLACE ONE SPRAY IN EACH NOSTRIL ONCE DAILY Yes Evie Castro MD   fexofenadine (ALLEGRA) 180 MG tablet Take 180 mg by mouth daily Yes Historical Provider, MD   Multiple Vitamins-Minerals (ONE DAILY MULTIVITAMIN MEN) TABS Take by mouth Yes Historical Provider, MD        Social History     Tobacco Use    Smoking status: Never    Smokeless tobacco: Never    Tobacco comments:     counseled on tobacco exposure avoidance   Vaping Use    Vaping Use: Never used   Substance Use Topics    Alcohol use: No     Alcohol/week: 0.0 standard drinks    Drug use: No       Review of Systems As above     Physical Exam  Constitutional:       General: He is not in acute distress. Appearance: Normal appearance. He is not ill-appearing. HENT:      Head: Normocephalic and atraumatic. Pulmonary:      Effort: Pulmonary effort is normal.   Musculoskeletal:         General: Normal range of motion. Cervical back: Normal range of motion. Skin:     Findings: No rash. Comments: 1 cm linear laceration left palm thenar eminence. Well healed. 2 simple sutures. No redness, swelling, tenderness or exudate. No underlying structures of concern (tendon, vessel, nerve or joint capsule)   Neurological:      General: No focal deficit present. Mental Status: He is alert and oriented to person, place, and time. Mental status is at baseline. Psychiatric:         Mood and Affect: Mood normal.         Behavior: Behavior normal.         Thought Content: Thought content normal.         Judgment: Judgment normal.       ASSESSMENT/PLAN:    1. Laceration of skin of left hand, subsequent encounter  -  - WA REMOVAL OF SUTURES    - patient requests removal of sutures. It is well healed, edges approximated fully and closed without signs of infection. - 2 simple sutures were removed without difficulty  - a steri strip was placed   - after care explained (avoid shear force on palm for another week or two)    Return if symptoms worsen or fail to improve. An  electronicsignature was used to authenticate this note.     --Andi Gamez MD on 1/31/2023 at 11:38 AM

## 2023-11-10 LAB
CHOLESTEROL, TOTAL: 217 MG/DL
CHOLESTEROL/HDL RATIO: 4.3
HDLC SERPL-MCNC: 50 MG/DL (ref 35–70)
LDL CHOLESTEROL CALCULATED: 149 MG/DL (ref 0–160)
NONHDLC SERPL-MCNC: NORMAL MG/DL
TRIGL SERPL-MCNC: 99 MG/DL
VLDLC SERPL CALC-MCNC: 18 MG/DL

## 2023-12-29 ENCOUNTER — OFFICE VISIT (OUTPATIENT)
Dept: FAMILY MEDICINE CLINIC | Age: 43
End: 2023-12-29
Payer: COMMERCIAL

## 2023-12-29 VITALS
WEIGHT: 228.4 LBS | OXYGEN SATURATION: 97 % | RESPIRATION RATE: 18 BRPM | TEMPERATURE: 98.6 F | HEART RATE: 88 BPM | DIASTOLIC BLOOD PRESSURE: 78 MMHG | BODY MASS INDEX: 35.85 KG/M2 | HEIGHT: 67 IN | SYSTOLIC BLOOD PRESSURE: 110 MMHG

## 2023-12-29 DIAGNOSIS — R20.2 NUMBNESS AND TINGLING IN BOTH HANDS: ICD-10-CM

## 2023-12-29 DIAGNOSIS — R73.03 PREDIABETES: ICD-10-CM

## 2023-12-29 DIAGNOSIS — Z00.00 WELL ADULT EXAM: Primary | ICD-10-CM

## 2023-12-29 DIAGNOSIS — B96.89 ACUTE BACTERIAL SINUSITIS: ICD-10-CM

## 2023-12-29 DIAGNOSIS — B35.1 ONYCHOMYCOSIS: ICD-10-CM

## 2023-12-29 DIAGNOSIS — J01.90 ACUTE BACTERIAL SINUSITIS: ICD-10-CM

## 2023-12-29 DIAGNOSIS — R20.0 NUMBNESS AND TINGLING IN BOTH HANDS: ICD-10-CM

## 2023-12-29 PROCEDURE — 99396 PREV VISIT EST AGE 40-64: CPT | Performed by: FAMILY MEDICINE

## 2023-12-29 RX ORDER — AZITHROMYCIN 250 MG/1
TABLET, FILM COATED ORAL
Qty: 1 PACKET | Refills: 0 | Status: SHIPPED | OUTPATIENT
Start: 2023-12-29

## 2023-12-29 RX ORDER — TERBINAFINE HYDROCHLORIDE 250 MG/1
250 TABLET ORAL DAILY
Qty: 84 TABLET | Refills: 0 | Status: SHIPPED | OUTPATIENT
Start: 2023-12-29 | End: 2024-03-22

## 2024-02-07 DIAGNOSIS — B35.1 ONYCHOMYCOSIS: ICD-10-CM

## 2024-02-07 LAB
ALBUMIN SERPL-MCNC: 4.7 G/DL (ref 3.4–5)
ALP SERPL-CCNC: 68 U/L (ref 40–129)
ALT SERPL-CCNC: 17 U/L (ref 10–40)
AST SERPL-CCNC: 21 U/L (ref 15–37)
BILIRUB DIRECT SERPL-MCNC: <0.2 MG/DL (ref 0–0.3)
BILIRUB INDIRECT SERPL-MCNC: NORMAL MG/DL (ref 0–1)
BILIRUB SERPL-MCNC: 0.3 MG/DL (ref 0–1)
PROT SERPL-MCNC: 7 G/DL (ref 6.4–8.2)

## 2024-05-21 ENCOUNTER — PATIENT MESSAGE (OUTPATIENT)
Dept: FAMILY MEDICINE CLINIC | Age: 44
End: 2024-05-21

## 2024-05-21 RX ORDER — SCOLOPAMINE TRANSDERMAL SYSTEM 1 MG/1
1 PATCH, EXTENDED RELEASE TRANSDERMAL
Qty: 10 PATCH | Refills: 0 | Status: SHIPPED | OUTPATIENT
Start: 2024-05-21

## 2024-05-21 NOTE — TELEPHONE ENCOUNTER
From: Lincoln Arzola  To: Dr. Luis Burgos  Sent: 5/21/2024 6:58 AM EDT  Subject: Motion sickness     We're going on a 12 day vacation where I will be on a boat several times (ocean and inshore). We leave in a couple weeks. Can I get a prescription for the motion sickness patch? Are there other options? The patch worked for me years ago.     Thanks,   Lincoln Arzola

## 2025-01-14 ASSESSMENT — PATIENT HEALTH QUESTIONNAIRE - PHQ9
SUM OF ALL RESPONSES TO PHQ9 QUESTIONS 1 & 2: 0
SUM OF ALL RESPONSES TO PHQ QUESTIONS 1-9: 0
SUM OF ALL RESPONSES TO PHQ QUESTIONS 1-9: 0
2. FEELING DOWN, DEPRESSED OR HOPELESS: NOT AT ALL
1. LITTLE INTEREST OR PLEASURE IN DOING THINGS: NOT AT ALL
SUM OF ALL RESPONSES TO PHQ9 QUESTIONS 1 & 2: 0
SUM OF ALL RESPONSES TO PHQ QUESTIONS 1-9: 0
SUM OF ALL RESPONSES TO PHQ QUESTIONS 1-9: 0
1. LITTLE INTEREST OR PLEASURE IN DOING THINGS: NOT AT ALL
2. FEELING DOWN, DEPRESSED OR HOPELESS: NOT AT ALL

## 2025-01-17 ENCOUNTER — OFFICE VISIT (OUTPATIENT)
Dept: FAMILY MEDICINE CLINIC | Age: 45
End: 2025-01-17
Payer: COMMERCIAL

## 2025-01-17 VITALS
DIASTOLIC BLOOD PRESSURE: 76 MMHG | SYSTOLIC BLOOD PRESSURE: 124 MMHG | OXYGEN SATURATION: 98 % | BODY MASS INDEX: 33.8 KG/M2 | HEIGHT: 68 IN | HEART RATE: 68 BPM | WEIGHT: 223 LBS | RESPIRATION RATE: 18 BRPM

## 2025-01-17 DIAGNOSIS — B35.1 ONYCHOMYCOSIS: ICD-10-CM

## 2025-01-17 DIAGNOSIS — Z00.00 WELL ADULT EXAM: Primary | ICD-10-CM

## 2025-01-17 PROCEDURE — 99396 PREV VISIT EST AGE 40-64: CPT | Performed by: FAMILY MEDICINE

## 2025-01-17 RX ORDER — TERBINAFINE HYDROCHLORIDE 250 MG/1
250 TABLET ORAL DAILY
Qty: 84 TABLET | Refills: 0 | Status: SHIPPED | OUTPATIENT
Start: 2025-01-17 | End: 2025-04-11

## 2025-01-17 SDOH — ECONOMIC STABILITY: FOOD INSECURITY: WITHIN THE PAST 12 MONTHS, THE FOOD YOU BOUGHT JUST DIDN'T LAST AND YOU DIDN'T HAVE MONEY TO GET MORE.: NEVER TRUE

## 2025-01-17 SDOH — ECONOMIC STABILITY: FOOD INSECURITY: WITHIN THE PAST 12 MONTHS, YOU WORRIED THAT YOUR FOOD WOULD RUN OUT BEFORE YOU GOT MONEY TO BUY MORE.: NEVER TRUE

## 2025-01-17 NOTE — PROGRESS NOTES
2025    Blood pressure 124/76, pulse 68, resp. rate 18, height 1.715 m (5' 7.5\"), weight 101.2 kg (223 lb), SpO2 98%.    Lincoln Arzola (:  1980) is a 44 y.o. male, here for evaluation of the following medical concerns:    Chief Complaint   Patient presents with    Annual Exam     Would like to have L foot Big toe looked at for possible nail fungus.     Here for check up/wellness.    Feels he is doing well.   Weight down 5 lbs    Had labs done at work :   Glucose 86, TC 21, HDL 49, trigs 115, .    The 10-year ASCVD risk score (Sujatha OBRIEN, et al., 2019) is: 2.5%    Values used to calculate the score:      Age: 44 years      Sex: Male      Is Non- : No      Diabetic: No      Tobacco smoker: No      Systolic Blood Pressure: 124 mmHg      Is BP treated: No      HDL Cholesterol: 50 mg/dL      Total Cholesterol: 217 mg/dL     Walks on treadmill 20 min/d  Using some weights (bowflex) prior to working.  Plays soccer once a week.  No injuries, thankfully.  He tends to eat too much, though- grazing, holidays.  This is his best opportunity to reduce calories.      Toe injury from cleats, affected nail and is seeing fungus on nail return.    Dental: sees dentist at least annually.     Has noted some skin tags about neck. Some spots on cheek.      Dental: sees dentist at least annually.   Optometry: Sees optometrist at least every 2 yrs.       Patient Active Problem List   Diagnosis    Skin lesion    Allergic rhinitis, seasonal    Prediabetes        Body mass index is 34.41 kg/m².    Wt Readings from Last 3 Encounters:   25 101.2 kg (223 lb)   23 103.6 kg (228 lb 6.4 oz)   23 90.6 kg (199 lb 12.8 oz)       BP Readings from Last 3 Encounters:   25 124/76   23 110/78   23 112/68       Allergies   Allergen Reactions    Amoxicillin Hives       Prior to Visit Medications    Medication Sig Taking? Authorizing Provider   acetaminophen (TYLENOL) 500 MG

## 2025-01-17 NOTE — ASSESSMENT & PLAN NOTE
The patient is asked to make an attempt to improve diet and exercise patterns to aid in medical management of this problem.

## 2025-01-17 NOTE — PATIENT INSTRUCTIONS
Use Lamisil for 12 wks on, hen 12 wks off the 4 more weeks.  250 mg once daily.  Call me when you are ready for the 4 wk treatment in 44 wks.

## 2025-04-15 ENCOUNTER — PATIENT MESSAGE (OUTPATIENT)
Dept: FAMILY MEDICINE CLINIC | Age: 45
End: 2025-04-15

## 2025-04-15 RX ORDER — TERBINAFINE HYDROCHLORIDE 250 MG/1
250 TABLET ORAL DAILY
Qty: 28 TABLET | Refills: 0 | Status: SHIPPED | OUTPATIENT
Start: 2025-04-15 | End: 2025-05-13